# Patient Record
Sex: FEMALE | Race: OTHER | HISPANIC OR LATINO
[De-identification: names, ages, dates, MRNs, and addresses within clinical notes are randomized per-mention and may not be internally consistent; named-entity substitution may affect disease eponyms.]

---

## 2017-03-27 ENCOUNTER — APPOINTMENT (OUTPATIENT)
Dept: ORTHOPEDIC SURGERY | Facility: CLINIC | Age: 67
End: 2017-03-27

## 2017-03-27 VITALS
HEIGHT: 63 IN | WEIGHT: 126 LBS | DIASTOLIC BLOOD PRESSURE: 75 MMHG | SYSTOLIC BLOOD PRESSURE: 119 MMHG | BODY MASS INDEX: 22.32 KG/M2 | HEART RATE: 70 BPM | TEMPERATURE: 98.3 F

## 2017-07-11 ENCOUNTER — MESSAGE (OUTPATIENT)
Age: 67
End: 2017-07-11

## 2017-09-11 ENCOUNTER — APPOINTMENT (OUTPATIENT)
Dept: ORTHOPEDIC SURGERY | Facility: CLINIC | Age: 67
End: 2017-09-11
Payer: MEDICARE

## 2017-09-11 VITALS
TEMPERATURE: 98.3 F | SYSTOLIC BLOOD PRESSURE: 125 MMHG | BODY MASS INDEX: 22.32 KG/M2 | HEART RATE: 65 BPM | WEIGHT: 126 LBS | DIASTOLIC BLOOD PRESSURE: 71 MMHG | HEIGHT: 63 IN

## 2017-09-11 PROCEDURE — 99214 OFFICE O/P EST MOD 30 MIN: CPT | Mod: 25

## 2017-09-11 PROCEDURE — 20610 DRAIN/INJ JOINT/BURSA W/O US: CPT | Mod: RT

## 2017-12-08 ENCOUNTER — APPOINTMENT (OUTPATIENT)
Dept: NEPHROLOGY | Facility: CLINIC | Age: 67
End: 2017-12-08
Payer: MEDICARE

## 2017-12-08 VITALS
HEIGHT: 63 IN | SYSTOLIC BLOOD PRESSURE: 122 MMHG | BODY MASS INDEX: 22.32 KG/M2 | DIASTOLIC BLOOD PRESSURE: 80 MMHG | WEIGHT: 126 LBS

## 2017-12-08 DIAGNOSIS — M25.551 PAIN IN RIGHT HIP: ICD-10-CM

## 2017-12-08 DIAGNOSIS — N39.0 URINARY TRACT INFECTION, SITE NOT SPECIFIED: ICD-10-CM

## 2017-12-08 PROCEDURE — 36415 COLL VENOUS BLD VENIPUNCTURE: CPT

## 2017-12-08 PROCEDURE — 99205 OFFICE O/P NEW HI 60 MIN: CPT | Mod: 25

## 2017-12-08 RX ORDER — METHYLPREDNISOLONE 4 MG/1
4 TABLET ORAL
Qty: 21 | Refills: 0 | Status: DISCONTINUED | COMMUNITY
Start: 2017-03-27 | End: 2017-12-08

## 2017-12-09 LAB
25(OH)D3 SERPL-MCNC: 29.6 NG/ML
ALBUMIN SERPL ELPH-MCNC: 4.3 G/DL
ANION GAP SERPL CALC-SCNC: 17 MMOL/L
APPEARANCE: CLEAR
BACTERIA: ABNORMAL
BASOPHILS # BLD AUTO: 0.03 K/UL
BASOPHILS NFR BLD AUTO: 0.4 %
BILIRUBIN URINE: ABNORMAL
BLOOD URINE: ABNORMAL
BUN SERPL-MCNC: 24 MG/DL
CALCIUM SERPL-MCNC: 10 MG/DL
CALCIUM SERPL-MCNC: 10 MG/DL
CHLORIDE SERPL-SCNC: 102 MMOL/L
CO2 SERPL-SCNC: 26 MMOL/L
COLOR: ABNORMAL
CREAT SERPL-MCNC: 1.46 MG/DL
CREAT SPEC-SCNC: 470 MG/DL
CREAT/PROT UR: 0.1 RATIO
EOSINOPHIL # BLD AUTO: 0.08 K/UL
EOSINOPHIL NFR BLD AUTO: 1
GLUCOSE QUALITATIVE U: NEGATIVE MG/DL
GLUCOSE SERPL-MCNC: 82 MG/DL
GRANULAR CASTS: 2 /LPF
HCT VFR BLD CALC: 44.9 %
HGB BLD-MCNC: 14.1 G/DL
HYALINE CASTS: 5 /LPF
IMM GRANULOCYTES NFR BLD AUTO: 0.5 %
KETONES URINE: NEGATIVE
LEUKOCYTE ESTERASE URINE: NEGATIVE
LYMPHOCYTES # BLD AUTO: 1.72 K/UL
LYMPHOCYTES NFR BLD AUTO: 21.3 %
MAN DIFF?: NORMAL
MCHC RBC-ENTMCNC: 30.5 PG
MCHC RBC-ENTMCNC: 31.4 GM/DL
MCV RBC AUTO: 97.2 FL
MICROSCOPIC-UA: NORMAL
MONOCYTES # BLD AUTO: 0.73 K/UL
MONOCYTES NFR BLD AUTO: 9 %
NEUTROPHILS # BLD AUTO: 5.49 K/UL
NEUTROPHILS NFR BLD AUTO: 67.8 %
NITRITE URINE: NEGATIVE
PARATHYROID HORMONE INTACT: 52 PG/ML
PH URINE: 5
PHOSPHATE SERPL-MCNC: 4.3 MG/DL
PLATELET # BLD AUTO: 387 K/UL
POTASSIUM SERPL-SCNC: 4.5 MMOL/L
PROT UR-MCNC: 48 MG/DL
PROTEIN URINE: 30 MG/DL
RBC # BLD: 4.62 M/UL
RBC # FLD: 16.8 %
RED BLOOD CELLS URINE: 20 /HPF
SODIUM SERPL-SCNC: 145 MMOL/L
SPECIFIC GRAVITY URINE: 1.03
SQUAMOUS EPITHELIAL CELLS: 4 /HPF
URINE COMMENTS: NORMAL
UROBILINOGEN URINE: NEGATIVE MG/DL
WBC # FLD AUTO: 8.09 K/UL
WHITE BLOOD CELLS URINE: 6 /HPF

## 2017-12-11 LAB — BACTERIA UR CULT: NORMAL

## 2018-01-05 ENCOUNTER — APPOINTMENT (OUTPATIENT)
Dept: NEPHROLOGY | Facility: CLINIC | Age: 68
End: 2018-01-05

## 2018-01-12 ENCOUNTER — APPOINTMENT (OUTPATIENT)
Dept: NEPHROLOGY | Facility: CLINIC | Age: 68
End: 2018-01-12
Payer: MEDICARE

## 2018-01-12 VITALS
DIASTOLIC BLOOD PRESSURE: 80 MMHG | HEIGHT: 63 IN | BODY MASS INDEX: 22.32 KG/M2 | SYSTOLIC BLOOD PRESSURE: 120 MMHG | WEIGHT: 126 LBS

## 2018-01-12 DIAGNOSIS — I10 ESSENTIAL (PRIMARY) HYPERTENSION: ICD-10-CM

## 2018-01-12 PROCEDURE — 36415 COLL VENOUS BLD VENIPUNCTURE: CPT

## 2018-01-12 PROCEDURE — 99215 OFFICE O/P EST HI 40 MIN: CPT | Mod: 25

## 2018-01-12 RX ORDER — AMLODIPINE BESYLATE 10 MG/1
10 TABLET ORAL
Qty: 90 | Refills: 0 | Status: ACTIVE | COMMUNITY
Start: 2017-11-13

## 2018-01-13 LAB
ALBUMIN SERPL ELPH-MCNC: 3.9 G/DL
ANION GAP SERPL CALC-SCNC: 17 MMOL/L
APPEARANCE: ABNORMAL
BACTERIA: NEGATIVE
BASOPHILS # BLD AUTO: 0.05 K/UL
BASOPHILS NFR BLD AUTO: 0.5 %
BILIRUBIN URINE: NEGATIVE
BLOOD URINE: ABNORMAL
BUN SERPL-MCNC: 25 MG/DL
CALCIUM SERPL-MCNC: 9.5 MG/DL
CHLORIDE SERPL-SCNC: 100 MMOL/L
CO2 SERPL-SCNC: 28 MMOL/L
COLOR: ABNORMAL
CREAT SERPL-MCNC: 1.59 MG/DL
CREAT SPEC-SCNC: 283 MG/DL
CREAT/PROT UR: 0.4 RATIO
EOSINOPHIL # BLD AUTO: 0.09 K/UL
EOSINOPHIL NFR BLD AUTO: 1 %
GLUCOSE QUALITATIVE U: NEGATIVE MG/DL
GLUCOSE SERPL-MCNC: 78 MG/DL
HCT VFR BLD CALC: 42.3 %
HGB BLD-MCNC: 13.2 G/DL
HYALINE CASTS: 6 /LPF
IMM GRANULOCYTES NFR BLD AUTO: 0.4 %
KETONES URINE: ABNORMAL
LEUKOCYTE ESTERASE URINE: NEGATIVE
LYMPHOCYTES # BLD AUTO: 1.75 K/UL
LYMPHOCYTES NFR BLD AUTO: 18.9 %
MAN DIFF?: NORMAL
MCHC RBC-ENTMCNC: 30.1 PG
MCHC RBC-ENTMCNC: 31.2 GM/DL
MCV RBC AUTO: 96.6 FL
MICROSCOPIC-UA: NORMAL
MONOCYTES # BLD AUTO: 0.91 K/UL
MONOCYTES NFR BLD AUTO: 9.8 %
NEUTROPHILS # BLD AUTO: 6.4 K/UL
NEUTROPHILS NFR BLD AUTO: 69.4 %
NITRITE URINE: NEGATIVE
PH URINE: 6
PHOSPHATE SERPL-MCNC: 4.2 MG/DL
PLATELET # BLD AUTO: 400 K/UL
POTASSIUM SERPL-SCNC: 4.9 MMOL/L
PROT UR-MCNC: 112 MG/DL
PROTEIN URINE: 100 MG/DL
RBC # BLD: 4.38 M/UL
RBC # FLD: 16.4 %
RED BLOOD CELLS URINE: 8 /HPF
SODIUM SERPL-SCNC: 145 MMOL/L
SPECIFIC GRAVITY URINE: 1.03
SQUAMOUS EPITHELIAL CELLS: 2 /HPF
URINE COMMENTS: NORMAL
UROBILINOGEN URINE: 1 MG/DL
WBC # FLD AUTO: 9.24 K/UL
WHITE BLOOD CELLS URINE: 4 /HPF

## 2018-01-13 RX ORDER — FLUCONAZOLE 150 MG/1
150 TABLET ORAL
Qty: 2 | Refills: 0 | Status: DISCONTINUED | COMMUNITY
Start: 2017-12-28 | End: 2018-01-13

## 2018-01-13 RX ORDER — OMEPRAZOLE 20 MG/1
20 CAPSULE, DELAYED RELEASE ORAL
Qty: 90 | Refills: 0 | Status: ACTIVE | COMMUNITY
Start: 2017-11-13

## 2018-01-13 RX ORDER — METOPROLOL SUCCINATE 50 MG/1
50 TABLET, EXTENDED RELEASE ORAL
Qty: 180 | Refills: 0 | Status: ACTIVE | COMMUNITY
Start: 2017-11-13

## 2018-01-13 RX ORDER — CEPHALEXIN 500 MG/1
500 CAPSULE ORAL
Qty: 14 | Refills: 0 | Status: DISCONTINUED | COMMUNITY
Start: 2017-12-28 | End: 2018-01-13

## 2018-01-13 RX ORDER — ZOLPIDEM TARTRATE 10 MG/1
10 TABLET ORAL
Qty: 30 | Refills: 0 | Status: ACTIVE | COMMUNITY
Start: 2017-11-07

## 2018-01-13 RX ORDER — UREA 39 G/100G
39 CREAM TOPICAL
Qty: 227 | Refills: 0 | Status: DISCONTINUED | COMMUNITY
Start: 2017-07-21 | End: 2018-01-13

## 2018-01-13 RX ORDER — MOMETASONE FUROATE 1 MG/G
0.1 CREAM TOPICAL
Qty: 45 | Refills: 0 | Status: DISCONTINUED | COMMUNITY
Start: 2017-09-12 | End: 2018-01-13

## 2018-01-13 RX ORDER — DICLOFENAC SODIUM 10 MG/G
1 GEL TOPICAL
Qty: 100 | Refills: 0 | Status: DISCONTINUED | COMMUNITY
Start: 2017-10-16 | End: 2018-01-13

## 2018-01-13 RX ORDER — DULOXETINE HYDROCHLORIDE 30 MG/1
30 CAPSULE, DELAYED RELEASE PELLETS ORAL
Qty: 60 | Refills: 0 | Status: ACTIVE | COMMUNITY
Start: 2017-11-07

## 2018-01-13 RX ORDER — IPRATROPIUM BROMIDE 42 UG/1
0.06 SPRAY NASAL
Qty: 15 | Refills: 0 | Status: DISCONTINUED | COMMUNITY
Start: 2017-09-12 | End: 2018-01-13

## 2018-01-17 ENCOUNTER — OTHER (OUTPATIENT)
Age: 68
End: 2018-01-17

## 2018-01-17 DIAGNOSIS — R31.29 OTHER MICROSCOPIC HEMATURIA: ICD-10-CM

## 2018-03-20 ENCOUNTER — APPOINTMENT (OUTPATIENT)
Dept: ORTHOPEDIC SURGERY | Facility: CLINIC | Age: 68
End: 2018-03-20
Payer: OTHER MISCELLANEOUS

## 2018-03-20 VITALS
HEIGHT: 63 IN | BODY MASS INDEX: 22.32 KG/M2 | DIASTOLIC BLOOD PRESSURE: 77 MMHG | HEART RATE: 70 BPM | SYSTOLIC BLOOD PRESSURE: 121 MMHG | WEIGHT: 126 LBS

## 2018-03-20 DIAGNOSIS — M47.812 SPONDYLOSIS W/OUT MYELOPATHY OR RADICULOPATHY, CERVICAL REGION: ICD-10-CM

## 2018-03-20 DIAGNOSIS — Z86.39 PERSONAL HISTORY OF OTHER ENDOCRINE, NUTRITIONAL AND METABOLIC DISEASE: ICD-10-CM

## 2018-03-20 DIAGNOSIS — N18.3 CHRONIC KIDNEY DISEASE, STAGE 3 (MODERATE): ICD-10-CM

## 2018-03-20 DIAGNOSIS — M75.51 BURSITIS OF RIGHT SHOULDER: ICD-10-CM

## 2018-03-20 PROCEDURE — 99204 OFFICE O/P NEW MOD 45 MIN: CPT

## 2018-03-20 PROCEDURE — 72040 X-RAY EXAM NECK SPINE 2-3 VW: CPT

## 2018-04-16 ENCOUNTER — APPOINTMENT (OUTPATIENT)
Dept: ORTHOPEDIC SURGERY | Facility: CLINIC | Age: 68
End: 2018-04-16
Payer: MEDICARE

## 2018-04-16 VITALS
SYSTOLIC BLOOD PRESSURE: 133 MMHG | BODY MASS INDEX: 22.15 KG/M2 | HEART RATE: 67 BPM | DIASTOLIC BLOOD PRESSURE: 80 MMHG | WEIGHT: 125 LBS | TEMPERATURE: 98.1 F | HEIGHT: 63 IN

## 2018-04-16 PROCEDURE — 73502 X-RAY EXAM HIP UNI 2-3 VIEWS: CPT | Mod: RT

## 2018-04-16 PROCEDURE — 99213 OFFICE O/P EST LOW 20 MIN: CPT

## 2018-04-16 RX ORDER — FLUCONAZOLE 200 MG/1
200 TABLET ORAL
Qty: 3 | Refills: 0 | Status: ACTIVE | COMMUNITY
Start: 2018-04-03

## 2018-04-16 RX ORDER — LUBIPROSTONE 8 UG/1
8 CAPSULE, GELATIN COATED ORAL
Qty: 180 | Refills: 0 | Status: ACTIVE | COMMUNITY
Start: 2018-02-06

## 2018-04-16 RX ORDER — MUPIROCIN 20 MG/G
2 OINTMENT TOPICAL
Qty: 22 | Refills: 0 | Status: ACTIVE | COMMUNITY
Start: 2018-03-06

## 2018-05-07 ENCOUNTER — APPOINTMENT (OUTPATIENT)
Dept: ORTHOPEDIC SURGERY | Facility: CLINIC | Age: 68
End: 2018-05-07

## 2018-05-07 DIAGNOSIS — M25.511 PAIN IN RIGHT SHOULDER: ICD-10-CM

## 2018-07-16 ENCOUNTER — APPOINTMENT (OUTPATIENT)
Dept: NEPHROLOGY | Facility: CLINIC | Age: 68
End: 2018-07-16

## 2018-09-10 ENCOUNTER — OTHER (OUTPATIENT)
Age: 68
End: 2018-09-10

## 2018-10-15 ENCOUNTER — APPOINTMENT (OUTPATIENT)
Dept: ORTHOPEDIC SURGERY | Facility: CLINIC | Age: 68
End: 2018-10-15
Payer: MEDICARE

## 2018-10-15 VITALS
HEART RATE: 58 BPM | WEIGHT: 125 LBS | SYSTOLIC BLOOD PRESSURE: 143 MMHG | DIASTOLIC BLOOD PRESSURE: 77 MMHG | BODY MASS INDEX: 22.15 KG/M2 | HEIGHT: 63 IN | TEMPERATURE: 99.3 F

## 2018-10-15 DIAGNOSIS — M25.562 PAIN IN RIGHT KNEE: ICD-10-CM

## 2018-10-15 DIAGNOSIS — M25.561 PAIN IN RIGHT KNEE: ICD-10-CM

## 2018-10-15 DIAGNOSIS — M25.50 PAIN IN UNSPECIFIED JOINT: ICD-10-CM

## 2018-10-15 PROCEDURE — 20610 DRAIN/INJ JOINT/BURSA W/O US: CPT | Mod: 59,RT

## 2018-10-15 PROCEDURE — 73562 X-RAY EXAM OF KNEE 3: CPT | Mod: 50

## 2018-10-15 PROCEDURE — 99214 OFFICE O/P EST MOD 30 MIN: CPT | Mod: 25

## 2019-01-10 ENCOUNTER — EMERGENCY (EMERGENCY)
Facility: HOSPITAL | Age: 69
LOS: 1 days | Discharge: HOME | End: 2019-01-10
Attending: EMERGENCY MEDICINE
Payer: MEDICARE

## 2019-01-10 VITALS
OXYGEN SATURATION: 100 % | HEART RATE: 66 BPM | DIASTOLIC BLOOD PRESSURE: 76 MMHG | WEIGHT: 126.1 LBS | RESPIRATION RATE: 18 BRPM | TEMPERATURE: 98 F | HEIGHT: 63 IN | SYSTOLIC BLOOD PRESSURE: 150 MMHG

## 2019-01-10 DIAGNOSIS — Z98.89 OTHER SPECIFIED POSTPROCEDURAL STATES: Chronic | ICD-10-CM

## 2019-01-10 DIAGNOSIS — Z87.19 PERSONAL HISTORY OF OTHER DISEASES OF THE DIGESTIVE SYSTEM: Chronic | ICD-10-CM

## 2019-01-10 DIAGNOSIS — N83.20 UNSPECIFIED OVARIAN CYSTS: Chronic | ICD-10-CM

## 2019-01-10 LAB
HCT VFR BLD CALC: 38.8 % — SIGNIFICANT CHANGE UP (ref 37–47)
HGB BLD-MCNC: 12.9 G/DL — SIGNIFICANT CHANGE UP (ref 12–16)
MCHC RBC-ENTMCNC: 31 PG — SIGNIFICANT CHANGE UP (ref 27–31)
MCHC RBC-ENTMCNC: 33.2 G/DL — SIGNIFICANT CHANGE UP (ref 32–36)
MCV RBC AUTO: 93.3 FL — SIGNIFICANT CHANGE UP (ref 81–99)
PLATELET # BLD AUTO: 342 K/UL — SIGNIFICANT CHANGE UP (ref 150–400)
RBC # BLD: 4.16 M/UL — LOW (ref 4.4–5.2)
RBC # FLD: 15 % — SIGNIFICANT CHANGE UP (ref 11–15.6)
WBC # BLD: 6.4 K/UL — SIGNIFICANT CHANGE UP (ref 4.8–10.8)
WBC # FLD AUTO: 6.4 K/UL — SIGNIFICANT CHANGE UP (ref 4.8–10.8)

## 2019-01-10 PROCEDURE — 71046 X-RAY EXAM CHEST 2 VIEWS: CPT | Mod: 26

## 2019-01-10 PROCEDURE — 99284 EMERGENCY DEPT VISIT MOD MDM: CPT | Mod: 25

## 2019-01-10 PROCEDURE — 93010 ELECTROCARDIOGRAM REPORT: CPT

## 2019-01-10 RX ORDER — CYCLOBENZAPRINE HYDROCHLORIDE 10 MG/1
10 TABLET, FILM COATED ORAL ONCE
Qty: 0 | Refills: 0 | Status: COMPLETED | OUTPATIENT
Start: 2019-01-10 | End: 2019-01-10

## 2019-01-10 RX ORDER — KETOROLAC TROMETHAMINE 30 MG/ML
15 SYRINGE (ML) INJECTION ONCE
Qty: 0 | Refills: 0 | Status: DISCONTINUED | OUTPATIENT
Start: 2019-01-10 | End: 2019-01-10

## 2019-01-10 RX ADMIN — Medication 15 MILLIGRAM(S): at 23:30

## 2019-01-10 RX ADMIN — CYCLOBENZAPRINE HYDROCHLORIDE 10 MILLIGRAM(S): 10 TABLET, FILM COATED ORAL at 23:24

## 2019-01-10 NOTE — ED ADULT NURSE NOTE - NSIMPLEMENTINTERV_GEN_ALL_ED
Implemented All Universal Safety Interventions:  [place] to call system. Call bell, personal items and telephone within reach. Instruct patient to call for assistance. Room bathroom lighting operational. Non-slip footwear when patient is off stretcher. Physically safe environment: no spills, clutter or unnecessary equipment. Stretcher in lowest position, wheels locked, appropriate side rails in place.

## 2019-01-10 NOTE — ED ADULT TRIAGE NOTE - CHIEF COMPLAINT QUOTE
Patient A&Ox4 complaining of chest pain radiating down arm x2 days, describes as pressure, stated cannot lift arm. Moving extremity without difficulty during triage. Respirations even & unlabored.

## 2019-01-10 NOTE — ED PROVIDER NOTE - NS ED ROS FT
no weight change, no fever or chills  no recent travel, no recent abox, no sick contacts  no recent change in medications  no rash, no bruises  no visual changes no eye discharge  no cough cold +congestion,   +sob, +chest pain  follows with cardiology  +stress test, no cath  no orthopnea, no pnd  no abd pain, no n/v/d  no endoscopy, +colonoscopy  no hematuria, no change in urinary habits  no joint pain, no deformity  no headache, +numbness to hands

## 2019-01-10 NOTE — ED PROVIDER NOTE - OBJECTIVE STATEMENT
67 y/o F with hx of DM presents to the ED c/o chest tightness with radiation of pain into L arm x3-4 days, worsening yesterday. Pt states she developed difficulty breathing today prompting her to seek ED evaluation. No PMD evaluation for sx. Pt reports difficulty with ROM of L arm secondary to pain. Denies recent travel, fever, chills, VIDES, cough, cold, orthopnea, recent abx. Pt has had recent stress test within the past few months, results negative. Last cardiologist evaluation 1 month ago. Pt was also evaluated by PMD at the time for medical clearance to receive epidural shots at neck. Pt has had colonoscopy ~2 years ago, results negative. Denies hx of MI. Pt states she was found with stroke 2-3 years ago.  PMD is Dr. Castillo  Cardiologist is Dr. Joe Zhou.

## 2019-01-10 NOTE — ED PROVIDER NOTE - PMH
Depression    Diabetes    GERD (gastroesophageal reflux disease)    Hypertension    Hypothyroid    Tachycardia  Not sure of specific name of heart irregularity

## 2019-01-10 NOTE — ED ADULT NURSE NOTE - CAS EDN DISCHARGE ASSESSMENT
Awake/Patient baseline mental status/Symptoms improved/Dressing clean and dry/Alert and oriented to person, place and time

## 2019-01-10 NOTE — ED PROVIDER NOTE - PSH
Bilateral ovarian cysts  left oopherectomy  H/O small bowel obstruction  with 3 surgeries  History of appendectomy

## 2019-01-10 NOTE — ED PROVIDER NOTE - MEDICAL DECISION MAKING DETAILS
67 y/o F followed by cardiologist presents with L sided chest pain and L shoulder pain worse with movement, plan to do 2 sets, obs and re-evaluate.

## 2019-01-10 NOTE — ED ADULT NURSE NOTE - OBJECTIVE STATEMENT
Patient complains of pressure in left side of chest radiating to left arm. Pain started few days ago, patient called the ED service and they told her to chew a 325mg Aspirin. Pain started while patient was driving, patient still feels pain at rest. SOB began today, patient came to ED. Denies vomiting, complains of nausea earlier in the morning, but resolved.

## 2019-01-10 NOTE — ED PROVIDER NOTE - PHYSICAL EXAMINATION
Constitutional : Appears comfortably, talking in full sentences  Head :NC AT , no swelling  Eyes :eomi, no swelling  Mouth :mm moist,  Neck : supple, trachea in midline  Chest :Madi air entry, symm chest expansion, no distress  Heart :S1 S2 distant  Abdomen :abd soft, non tender  Musc/Skel :ext no swelling, no deformity, no spine tenderness, distal pulses present  Neuro  :AAO 3 no focal deficits Constitutional : Appears uncomfortably secondary to change of position, talking in full sentences  Head :NC AT , no swelling  Eyes :eomi, no swelling  Mouth :mm moist,  Neck : supple, trachea in midline  Chest :Madi air entry, symm chest expansion, no distress  Heart :S1 S2 distant  Abdomen :abd soft, non tender  Musc/Skel :ext no swelling, no deformity, no spine tenderness, distal pulses present, pt c/o pain exacerbation with movement of LUE, TTP at upper back  Neuro  :AAO 3 no focal deficits

## 2019-01-11 VITALS
OXYGEN SATURATION: 97 % | HEART RATE: 48 BPM | DIASTOLIC BLOOD PRESSURE: 80 MMHG | TEMPERATURE: 98 F | RESPIRATION RATE: 16 BRPM | SYSTOLIC BLOOD PRESSURE: 144 MMHG

## 2019-01-11 LAB
ALBUMIN SERPL ELPH-MCNC: 3.7 G/DL — SIGNIFICANT CHANGE UP (ref 3.3–5.2)
ALP SERPL-CCNC: 72 U/L — SIGNIFICANT CHANGE UP (ref 40–120)
ALT FLD-CCNC: 13 U/L — SIGNIFICANT CHANGE UP
ANION GAP SERPL CALC-SCNC: 13 MMOL/L — SIGNIFICANT CHANGE UP (ref 5–17)
APPEARANCE UR: CLEAR — SIGNIFICANT CHANGE UP
APTT BLD: 27.9 SEC — SIGNIFICANT CHANGE UP (ref 27.5–36.3)
AST SERPL-CCNC: 22 U/L — SIGNIFICANT CHANGE UP
BACTERIA # UR AUTO: ABNORMAL
BILIRUB SERPL-MCNC: 0.3 MG/DL — LOW (ref 0.4–2)
BILIRUB UR-MCNC: NEGATIVE — SIGNIFICANT CHANGE UP
BUN SERPL-MCNC: 17 MG/DL — SIGNIFICANT CHANGE UP (ref 8–20)
CALCIUM SERPL-MCNC: 8.9 MG/DL — SIGNIFICANT CHANGE UP (ref 8.6–10.2)
CHLORIDE SERPL-SCNC: 102 MMOL/L — SIGNIFICANT CHANGE UP (ref 98–107)
CO2 SERPL-SCNC: 26 MMOL/L — SIGNIFICANT CHANGE UP (ref 22–29)
COLOR SPEC: YELLOW — SIGNIFICANT CHANGE UP
CREAT SERPL-MCNC: 0.82 MG/DL — SIGNIFICANT CHANGE UP (ref 0.5–1.3)
D DIMER BLD IA.RAPID-MCNC: 264 NG/ML DDU — HIGH
DIFF PNL FLD: ABNORMAL
EPI CELLS # UR: SIGNIFICANT CHANGE UP
GLUCOSE SERPL-MCNC: 124 MG/DL — HIGH (ref 70–115)
GLUCOSE UR QL: NEGATIVE MG/DL — SIGNIFICANT CHANGE UP
INR BLD: 0.95 RATIO — SIGNIFICANT CHANGE UP (ref 0.88–1.16)
KETONES UR-MCNC: NEGATIVE — SIGNIFICANT CHANGE UP
LEUKOCYTE ESTERASE UR-ACNC: ABNORMAL
LIDOCAIN IGE QN: 62 U/L — HIGH (ref 22–51)
MAGNESIUM SERPL-MCNC: 1.8 MG/DL — SIGNIFICANT CHANGE UP (ref 1.6–2.6)
NITRITE UR-MCNC: NEGATIVE — SIGNIFICANT CHANGE UP
PH UR: 7 — SIGNIFICANT CHANGE UP (ref 5–8)
POTASSIUM SERPL-MCNC: 3.8 MMOL/L — SIGNIFICANT CHANGE UP (ref 3.5–5.3)
POTASSIUM SERPL-SCNC: 3.8 MMOL/L — SIGNIFICANT CHANGE UP (ref 3.5–5.3)
PROT SERPL-MCNC: 6.7 G/DL — SIGNIFICANT CHANGE UP (ref 6.6–8.7)
PROT UR-MCNC: NEGATIVE MG/DL — SIGNIFICANT CHANGE UP
PROTHROM AB SERPL-ACNC: 10.9 SEC — SIGNIFICANT CHANGE UP (ref 10–12.9)
RBC CASTS # UR COMP ASSIST: ABNORMAL /HPF (ref 0–4)
SODIUM SERPL-SCNC: 141 MMOL/L — SIGNIFICANT CHANGE UP (ref 135–145)
SP GR SPEC: 1 — LOW (ref 1.01–1.02)
TROPONIN T SERPL-MCNC: <0.01 NG/ML — SIGNIFICANT CHANGE UP (ref 0–0.06)
UROBILINOGEN FLD QL: NEGATIVE MG/DL — SIGNIFICANT CHANGE UP
WBC UR QL: SIGNIFICANT CHANGE UP

## 2019-01-11 PROCEDURE — 71046 X-RAY EXAM CHEST 2 VIEWS: CPT

## 2019-01-11 PROCEDURE — 96374 THER/PROPH/DIAG INJ IV PUSH: CPT

## 2019-01-11 PROCEDURE — 99218: CPT

## 2019-01-11 PROCEDURE — 83690 ASSAY OF LIPASE: CPT

## 2019-01-11 PROCEDURE — 85379 FIBRIN DEGRADATION QUANT: CPT

## 2019-01-11 PROCEDURE — 85027 COMPLETE CBC AUTOMATED: CPT

## 2019-01-11 PROCEDURE — 36415 COLL VENOUS BLD VENIPUNCTURE: CPT

## 2019-01-11 PROCEDURE — 85610 PROTHROMBIN TIME: CPT

## 2019-01-11 PROCEDURE — 85730 THROMBOPLASTIN TIME PARTIAL: CPT

## 2019-01-11 PROCEDURE — 71275 CT ANGIOGRAPHY CHEST: CPT | Mod: 26

## 2019-01-11 PROCEDURE — 83735 ASSAY OF MAGNESIUM: CPT

## 2019-01-11 PROCEDURE — 93005 ELECTROCARDIOGRAM TRACING: CPT

## 2019-01-11 PROCEDURE — 80053 COMPREHEN METABOLIC PANEL: CPT

## 2019-01-11 PROCEDURE — 81001 URINALYSIS AUTO W/SCOPE: CPT

## 2019-01-11 PROCEDURE — 93010 ELECTROCARDIOGRAM REPORT: CPT | Mod: 76

## 2019-01-11 PROCEDURE — 71275 CT ANGIOGRAPHY CHEST: CPT

## 2019-01-11 PROCEDURE — 99284 EMERGENCY DEPT VISIT MOD MDM: CPT | Mod: 25

## 2019-01-11 PROCEDURE — G0378: CPT

## 2019-01-11 PROCEDURE — 84484 ASSAY OF TROPONIN QUANT: CPT

## 2019-01-11 RX ORDER — ASPIRIN/CALCIUM CARB/MAGNESIUM 324 MG
325 TABLET ORAL ONCE
Qty: 0 | Refills: 0 | Status: DISCONTINUED | OUTPATIENT
Start: 2019-01-11 | End: 2019-01-11

## 2019-01-11 RX ORDER — PANTOPRAZOLE SODIUM 20 MG/1
40 TABLET, DELAYED RELEASE ORAL
Qty: 0 | Refills: 0 | Status: DISCONTINUED | OUTPATIENT
Start: 2019-01-11 | End: 2019-01-15

## 2019-01-11 RX ORDER — LEVOTHYROXINE SODIUM 125 MCG
88 TABLET ORAL DAILY
Qty: 0 | Refills: 0 | Status: DISCONTINUED | OUTPATIENT
Start: 2019-01-11 | End: 2019-01-15

## 2019-01-11 RX ORDER — METOPROLOL TARTRATE 50 MG
50 TABLET ORAL DAILY
Qty: 0 | Refills: 0 | Status: DISCONTINUED | OUTPATIENT
Start: 2019-01-11 | End: 2019-01-15

## 2019-01-11 RX ORDER — AMLODIPINE BESYLATE 2.5 MG/1
10 TABLET ORAL ONCE
Qty: 0 | Refills: 0 | Status: COMPLETED | OUTPATIENT
Start: 2019-01-11 | End: 2019-01-11

## 2019-01-11 RX ORDER — ACETAMINOPHEN 500 MG
650 TABLET ORAL EVERY 6 HOURS
Qty: 0 | Refills: 0 | Status: DISCONTINUED | OUTPATIENT
Start: 2019-01-11 | End: 2019-01-15

## 2019-01-11 RX ORDER — ZOLPIDEM TARTRATE 10 MG/1
5 TABLET ORAL AT BEDTIME
Qty: 0 | Refills: 0 | Status: DISCONTINUED | OUTPATIENT
Start: 2019-01-11 | End: 2019-01-11

## 2019-01-11 RX ORDER — GLYBURIDE 5 MG
2.5 TABLET ORAL
Qty: 0 | Refills: 0 | Status: DISCONTINUED | OUTPATIENT
Start: 2019-01-11 | End: 2019-01-15

## 2019-01-11 RX ADMIN — Medication 650 MILLIGRAM(S): at 04:50

## 2019-01-11 RX ADMIN — PANTOPRAZOLE SODIUM 40 MILLIGRAM(S): 20 TABLET, DELAYED RELEASE ORAL at 09:32

## 2019-01-11 RX ADMIN — Medication 650 MILLIGRAM(S): at 06:09

## 2019-01-11 RX ADMIN — Medication 88 MICROGRAM(S): at 06:08

## 2019-01-11 RX ADMIN — Medication 15 MILLIGRAM(S): at 09:33

## 2019-01-11 RX ADMIN — Medication 50 MILLIGRAM(S): at 06:28

## 2019-01-11 RX ADMIN — AMLODIPINE BESYLATE 10 MILLIGRAM(S): 2.5 TABLET ORAL at 06:07

## 2019-01-11 NOTE — ED ADULT NURSE REASSESSMENT NOTE - NS ED NURSE REASSESS COMMENT FT1
Pt alert and oriented. resting in stretcher, no signs of distress noted. Handoff  Fahad Thompson RN given to and safety maintained. RN with no questions or concerns at this time
assumed pt care from previous Rn Tatum Denton.  pt returned from CT scan. pt transported to CDU-7 for observation. pt  A&Ox3;  resting in stretcher, with complaints of 1/10 discomfort to left arm. denies any radiation of pain. KULDIP Tineo made aware and per PA will place orders for Tylenol.  B/L lungs clear, normal s1&s2 heard on ausculation. (+) pedal pulses, skin warm/dry intact. IV patent. VSS and documented as per flow sheet.  PA at bedside; plan of care reviewed and pt verbalizes understanding. bed in lowest position, call bell within reach and safety maintained. monitoring ongoing for any changes.
Report received from offgoing RN, chart as noted, pt a&ox3 resting comfortably on stretcher, NSR on cm. RR even and unlabored. Skin warm and dry. Appears in no apparent distress @ this time, safety maintained, call bell in reach. Urine cup provided, pt verbalized understanding of providing urine sample @ this time

## 2019-01-11 NOTE — ED CDU PROVIDER DISPOSITION NOTE - CLINICAL COURSE
Patient evaluated on morning obs rounds; patient currently symptom free; trops neg x 3; ct angio negative; no events on tele; consult appreciated and reviewed from [facility]; patient with recent ischemic eval, echo, and nuc stress test, all without ischemic concerns; agree with [facility] recommendations, ok for d/c with outpt f/u

## 2019-01-11 NOTE — ED CDU PROVIDER DISPOSITION NOTE - CARE PROVIDER_API CALL
Sylvie Lerner), Cardiovascular Disease; Internal Medicine  [address] [place] [address]  Phone: [phone]  Fax: [phone]

## 2019-01-11 NOTE — ED CDU PROVIDER INITIAL DAY NOTE - ATTENDING CONTRIBUTION TO CARE
I, NIMESH Gomez MD, personally performed the services described in the documentation, reviewed the documentation recorded by the scribe in my presence and it accurately and completely records my words and action

## 2019-01-11 NOTE — ED CDU PROVIDER INITIAL DAY NOTE - PHYSICAL EXAMINATION
Constitutional : Appears uncomfortably secondary to change of position, talking in full sentences  Head :NC AT , no swelling  Eyes :eomi, no swelling  Mouth :mm moist,  Neck : supple, trachea in midline  Chest :Madi air entry, symm chest expansion, no distress  Heart :S1 S2 distant  Abdomen :abd soft, non tender  Musc/Skel :ext no swelling, no deformity, no spine tenderness, distal pulses present, pt c/o pain exacerbation with movement of LUE, TTP at upper back  Neuro  :AAO 3 no focal deficits

## 2019-01-11 NOTE — ED CDU PROVIDER INITIAL DAY NOTE - OBJECTIVE STATEMENT
69 y/o F with hx of DM presents to the ED c/o chest tightness with radiation of pain into L arm x3-4 days, worsening yesterday. Pt states she developed difficulty breathing today prompting her to seek ED evaluation. No PMD evaluation for sx. Pt reports difficulty with ROM of L arm secondary to pain. Denies recent travel, fever, chills, VIDES, cough, cold, orthopnea, recent abx. Pt has had recent stress test within the past few months, results negative. Last cardiologist evaluation 1 month ago. Pt was also evaluated by PMD at the time for medical clearance to receive epidural shots at neck. Pt has had colonoscopy ~2 years ago, results negative. Denies hx of MI. Pt states she was found with stroke 2-3 years ago.  PMD is Dr. Castillo  Cardiologist is Dr. Joe Zhou.

## 2019-01-11 NOTE — CONSULT NOTE ADULT - SUBJECTIVE AND OBJECTIVE BOX
[facility]                                                    [address], [address], [place] [address]                                                         PHONE: [phone]    FAX: [phone] [address], [address], [place] [address]                                                 PHONE: [phone]    FAX: [phone]  *******************************************************************************    Reason for Consult: chest pain    HPI:  GRECIA MAYORGA is a 68y Female with cc of constant left sided chest pain x several days with radiation to left arm. Pt reports she has neck problems (requiring cervical epidurals) and cannot lift her left arm. Symptoms were mild to moderate in intensity and described as a pressure. On day of presentation, pt developed SOB. Pt reports symptoms have resolved today. Hx of HTN, HL and diabetes as well as thyroid dysfunction. Trying to discontinue smoking. Denies COPD. Denies PND or orthopnea, dizziness or syncope. No fever, chills, URI or constitutional symptoms.  Reports recent negative ischemic evaluation. Past hx of SVT ablation.    PAST MEDICAL & SURGICAL HISTORY:  Diabetes  Tachycardia: Not sure of specific name of heart irregularity  Hypertension  Hypothyroid  Depression  GERD (gastroesophageal reflux disease)  History of appendectomy  Bilateral ovarian cysts: left oopherectomy  H/O small bowel obstruction: with 3 surgeries  cervical epidurals  SVT ablation    Allergies:  codeine (Unknown)  latex (Unknown)  niacin (Unknown)      MEDICATIONS  (STANDING):  glyBURIDE   Tablet. 2.5 milliGRAM(s) Oral with breakfast  levothyroxine 88 MICROGram(s) Oral daily  metoprolol succinate ER 50 milliGRAM(s) Oral daily  pantoprazole    Tablet 40 milliGRAM(s) Oral before breakfast    MEDICATIONS  (PRN):  acetaminophen   Tablet .. 650 milliGRAM(s) Oral every 6 hours PRN Mild Pain (1 - 3), Moderate Pain (4 - 6), Severe Pain (7 - 10)  zolpidem 5 milliGRAM(s) Oral at bedtime PRN Insomnia      Social History: active tobacco /no EtOH / IVDA    Family History: Mother had CHF in her 60's and passed of renal CA at age 79. Sister diabetic with CHF. Pt is not in contact with her father.    ROS: All pertinent positive and negative ROS as noted above, otherwise all other 12 pt ROS is unremarkable.    Vital Signs Last 24 Hrs  T(C): 36.7 (11 Jan 2019 03:57), Max: 36.7 (10 Nilson 2019 20:02)  T(F): 98 (11 Jan 2019 03:57), Max: 98.1 (10 Nilson 2019 20:02)  HR: 66 (11 Jan 2019 06:06) (58 - 86)  BP: 148/80 (11 Jan 2019 06:06) (126/79 - 150/76)  BP(mean): --  RR: 16 (11 Jan 2019 03:57) (16 - 18)  SpO2: 95% (11 Jan 2019 03:57) (95% - 100%)    I&O's Detail    I&O's Summary          PHYSICAL EXAM:  General: Appears well developed, well nourished, no acute distress  HEENT: Head: normocephalic, atraumatic  Eyes: Pupils equal and reactive  Neck: Supple, no carotid bruit, no JVD, no HJR  CARDIOVASCULAR: Normal S1 and S2, no murmur, rub, or gallop  LUNGS: Clear to auscultation bilaterally, no rales, rhonchi or wheeze  ABDOMEN: Soft, nontender, non-distended, positive bowel sounds, no mass or bruit  EXTREMITIES: No edema, distal pulses WNL  SKIN: Warm and dry with normal turgor  NEURO: Alert & oriented x 3, grossly intact  PSYCH: normal mood and affect      LABS:                        12.9   6.4   )-----------( 342      ( 10 Nilson 2019 23:49 )             38.8     01-10    141  |  102  |  17.0  ----------------------------<  124<H>  3.8   |  26.0  |  0.82    Ca    8.9      10 Nilson 2019 23:49  Mg     1.8     01-10    TPro  6.7  /  Alb  3.7  /  TBili  0.3<L>  /  DBili  x   /  AST  22  /  ALT  13  /  AlkPhos  72  01-10    CARDIAC MARKERS ( 11 Jan 2019 04:20 )  x     / <0.01 ng/mL / x     / x     / x      CARDIAC MARKERS ( 10 Nilson 2019 23:49 )  x     / <0.01 ng/mL / x     / x     / x          PT/INR - ( 10 Nilson 2019 23:49 )   PT: 10.9 sec;   INR: 0.95 ratio         PTT - ( 10 Nilson 2019 23:49 )  PTT:27.9 sec    RADIOLOGY & ADDITIONAL STUDIES:    ECG: SR. Tw inversion V1-3 (also noted on office ECG's)    < from: CT Angio Chest w/ IV Cont (01.11.19 @ 03:35) >  IMPRESSION:    No evidence of pulmonary embolism.  Bibasilar streaky atelectasis.    < end of copied text >        Assessment and Plan:  In summary, GRECIA MAYORGA is a 68y Female with past medical history significant for cc of constant left sided chest pain x several days with radiation to left arm. Pt reports she has neck problems (requiring cervical epidurals) and cannot lift her left arm. Symptoms were mild to moderate in intensity and described as a pressure. On day of presentation, pt developed SOB. Pt reports symptoms have resolved today. Hx of HTN and diabetes as well as thyroid dysfunction. Trying to discontinue smoking. Denies COPD. Denies PND or orthopnea, dizziness or syncope. No fever, chills, URI or constitutional symptoms. Reports recent negative ischemic evaluation.  Past hx of SVT ablation    Nuclear stress 7/11/18 no ischemia. EF 77%  Echo 3/1/18 EF 70-75%. Tr MR/TR    - Office records reviewed    - Atypical CP. CE negative x 2. Mild increased D dimer. CTA without evidence of PE. Pt with cervical spine disease requiring epidural injections. Symptoms suggest musculoskeletal pain. Would maintain ASA for now. Pt may pursue outpt ischemic al due to no documented ischemia, negative cardiac enzymes, normal LV function and recent negative ischemic evaluation    - Chronically abnormal ECG. ECG compared to prior office ECG's and is essentially unchanged    - HTN. Maintain toprol and norvasc. BP's have been reviewed.    - Glycemic control    - Nicotine dependence. Smoking cessation dw pt    - No evidence of acute pulmonic process or PE    - May DC home from the cardiac standpoint with outpt fu via our office in one week.

## 2019-01-28 ENCOUNTER — APPOINTMENT (OUTPATIENT)
Dept: ORTHOPEDIC SURGERY | Facility: CLINIC | Age: 69
End: 2019-01-28
Payer: MEDICARE

## 2019-01-28 PROCEDURE — 99213 OFFICE O/P EST LOW 20 MIN: CPT

## 2019-01-28 NOTE — ADDENDUM
[FreeTextEntry1] : I, Amauri Sauceda, acted solely as a scribe for Dr. Joe Shepard on this date 01/28/2019 .

## 2019-01-28 NOTE — REASON FOR VISIT
[Follow-Up Visit] : a follow-up visit for [Knee Pain] : knee pain [FreeTextEntry2] : Left knee pain. Here to discuss MRI results.

## 2019-01-28 NOTE — PHYSICAL EXAM
[LE] : Sensory: Intact in bilateral lower extremities [ALL] : dorsalis pedis, posterior tibial, femoral, popliteal, and radial 2+ and symmetric bilaterally [Normal] : Oriented to person, place, and time, insight and judgement were intact and the affect was normal [Poor Appearance] : well-appearing [Acute Distress] : not in acute distress [Obese] : not obese [de-identified] : GENERAL APPEARANCE: Well nourished and hydrated, pleasant, alert, and oriented x 3. Appears their stated age. \par HEENT: Normocephalic, extraocular eye motion intact. Nasal septum midline. Oral cavity clear. External auditory canal clear. \par RESPIRATORY: Breath sounds clear and audible in all lobes. No wheezing, No accessory muscle use.\par CARDIOVASCULAR: No apparent abnormalities. No lower leg edema. No varicosities. Pedal pulses are palpable.\par NEUROLOGIC: Sensation is normal, no muscle weakness in the upper or lower extremities.\par DERMATOLOGIC: No apparent skin lesions, moist, warm, no rash.\par SPINE: Cervical spine appears normal and moves freely; thoracic spine appears normal and moves freely; lumbosacral spine appears normal and moves freely, normal, nontender.\par MUSCULOSKELETAL: Hands, wrists, and elbows are normal and move freely, shoulders are normal and move freely. [de-identified] : Right hip examination demonstrates exquisite tenderness over the bursae without stiffness or limited ROM, neg stinchfiled\par Bilateral knees show no effusion full range of motion exquisite tenderness to palpation along the patella and medial joint line no instability\par  [de-identified] : MRI of the left knee at [facility] 10/30/2018 shows lateral meniscus degenerated anterior horn, peripheral tear without evidence for articular surface extension. Trace Lanier's cyst.

## 2019-01-28 NOTE — DISCUSSION/SUMMARY
[de-identified] : 68 year old presents with partial tear lateral meniscus left knee, mild tricompartmental osteoarthritis of the bilateral knees, right hip bursitis. We talked about the nature of the condition and treatment options. She is doing well no with her right hip, so she will continue nonoperative treatment at this time. Pt will follow up here prn for injections. \par \par The percentages of success in an arthroscopy that involves a torn meniscus and arthritic changes is dependent upon how bad the arthritic changes are. Basically, removing a meniscal tear allows us to ascertain how bad the patient's articular cartilage destruction (arthritis) is. The arthroscopy cleans out any debris from the arthritic process as well as removing the meniscal tear. Approximately 75% of the patients will say that they feel relief, although their x-rays will continue to show significant arthritic changes. Arthroscopy for arthritis is a temporizing procedure, yielding subjective success (patient satisfaction) for less than two to five years. In some cases, the knee might eventually require a knee replacement for symptomatic relief. The prognostic factors that are somewhat favorable predictive values in arthroscopic debridements (removal of loose articular cartilage, loose body and inflamed synovium) of an arthritic knee are: short duration of symptoms, effusion (swelling), minimal deformity and good range of motion. The complications with any arthroscopy include the risk of anaesthetic complications and death, blood clots and pulmonary embolus, infection (less than 1%), nerve damage, by which we would mean a peroneal palsy (less than 0.1%) (small area of skin numbness is so common, we do not consider its presence a complication), injury to the popliteal artery, which is so rare that there are no statistics, but should it occur could theoretically lead to amputation, which is extremely unlikely. There is often a chance of getting a hemarthrosis (blood in the joint) but this usually resolves with local measures of icing, physical therapy, and aspiration. Reflex sympathetic dystrophy (RSD) is another extremely rare but theoretical complication. This (RSD) means that the patient has a stiff painful joint that is out of proportion to the objective pathology of the knee. Subsequently, it might require years of physical therapy before one regains a functional knee with RSD. Infrapatellar contracture syndrome (stiff joint) is sometimes reported and associated with RSD, but it usually is a result of not being aggressive in physical therapy. I think the patient understands the risk benefit ratio of arthroscopy and will think about whether they would prefer the nonoperative or surgical treatment option.

## 2019-01-28 NOTE — HISTORY OF PRESENT ILLNESS
[Stable] : stable [de-identified] : 68 year old female presents for follow-up evaluation of left knee pain and review of MRI results. The patient also notes bilateral knee pain with the left knee worse than the right, which started about 6-8 weeks ago. She reports difficulty walking from the knees. She states the left knee has been swelling significantly. She has trouble with stairs and prolonged walking or any squatting. Cortisone injection given last visit, on 10/15/18, provided good pain relief. She is doing well after her right hip injection as well. Pt has chronic lower back pain treated with intermittent injections by Dr. Medina.

## 2019-02-05 ENCOUNTER — OTHER (OUTPATIENT)
Age: 69
End: 2019-02-05

## 2019-05-30 ENCOUNTER — APPOINTMENT (OUTPATIENT)
Dept: RHEUMATOLOGY | Facility: CLINIC | Age: 69
End: 2019-05-30

## 2019-08-07 ENCOUNTER — OTHER (OUTPATIENT)
Age: 69
End: 2019-08-07

## 2019-08-19 ENCOUNTER — APPOINTMENT (OUTPATIENT)
Dept: ORTHOPEDIC SURGERY | Facility: CLINIC | Age: 69
End: 2019-08-19
Payer: MEDICARE

## 2019-08-19 VITALS
BODY MASS INDEX: 22.15 KG/M2 | WEIGHT: 125 LBS | HEIGHT: 63 IN | SYSTOLIC BLOOD PRESSURE: 142 MMHG | HEART RATE: 82 BPM | DIASTOLIC BLOOD PRESSURE: 84 MMHG

## 2019-08-19 DIAGNOSIS — M18.11 UNILATERAL PRIMARY OSTEOARTHRITIS OF FIRST CARPOMETACARPAL JOINT, RIGHT HAND: ICD-10-CM

## 2019-08-19 PROCEDURE — 99212 OFFICE O/P EST SF 10 MIN: CPT

## 2019-08-19 RX ORDER — CEVIMELINE HYDROCHLORIDE 30 MG/1
30 CAPSULE ORAL
Refills: 0 | Status: ACTIVE | COMMUNITY

## 2019-08-19 NOTE — REVIEW OF SYSTEMS
[Joint Pain] : joint pain [Joint Swelling] : joint swelling [Negative] : Heme/Lymph [FreeTextEntry9] : left knee. right hip

## 2019-08-19 NOTE — DISCUSSION/SUMMARY
[de-identified] : 68 year old presents with partial tear lateral meniscus left knee, mild tricompartmental osteoarthritis of the bilateral knees, right hip bursitis. We talked about the nature of the condition and treatment options. She is doing well no with her right hip, so she will continue nonoperative treatment at this time. Pt will follow up here prn for injections. I referred pt to see dr ha for right cmc joint arthritis and dr rivera for left foot pain.\par \par The percentages of success in an arthroscopy that involves a torn meniscus and arthritic changes is dependent upon how bad the arthritic changes are. Basically, removing a meniscal tear allows us to ascertain how bad the patient's articular cartilage destruction (arthritis) is. The arthroscopy cleans out any debris from the arthritic process as well as removing the meniscal tear. Approximately 75% of the patients will say that they feel relief, although their x-rays will continue to show significant arthritic changes. Arthroscopy for arthritis is a temporizing procedure, yielding subjective success (patient satisfaction) for less than two to five years. In some cases, the knee might eventually require a knee replacement for symptomatic relief. The prognostic factors that are somewhat favorable predictive values in arthroscopic debridements (removal of loose articular cartilage, loose body and inflamed synovium) of an arthritic knee are: short duration of symptoms, effusion (swelling), minimal deformity and good range of motion. The complications with any arthroscopy include the risk of anaesthetic complications and death, blood clots and pulmonary embolus, infection (less than 1%), nerve damage, by which we would mean a peroneal palsy (less than 0.1%) (small area of skin numbness is so common, we do not consider its presence a complication), injury to the popliteal artery, which is so rare that there are no statistics, but should it occur could theoretically lead to amputation, which is extremely unlikely. There is often a chance of getting a hemarthrosis (blood in the joint) but this usually resolves with local measures of icing, physical therapy, and aspiration. Reflex sympathetic dystrophy (RSD) is another extremely rare but theoretical complication. This (RSD) means that the patient has a stiff painful joint that is out of proportion to the objective pathology of the knee. Subsequently, it might require years of physical therapy before one regains a functional knee with RSD. Infrapatellar contracture syndrome (stiff joint) is sometimes reported and associated with RSD, but it usually is a result of not being aggressive in physical therapy. I think the patient understands the risk benefit ratio of arthroscopy and will think about whether they would prefer the nonoperative or surgical treatment option. \par  \par

## 2019-08-19 NOTE — HISTORY OF PRESENT ILLNESS
[Stable] : stable [Pain Location] : pain [2] : a current pain level of 2/10 [0] : a minimum pain level of 0/10 [1] : an average pain level of 1/10 [4] : a maximum pain level of 4/10 [Standing] : standing [Daily] : ~He/She~ states the symptoms seem to be occuring daily [Hip Movement] : worsened by hip movement [Walking] : worsened by walking [NSAIDs] : relieved by nonsteroidal anti-inflammatory drugs [Rest] : relieved by rest [de-identified] : 68 year old female presents for follow-up evaluation of left knee and right hip apin. she had MRI 1/2019 with partial tear of lateral meniscus. no other internal derangement.  she reports INSTABILITY, feels like the knee cap slips laterally.  She reports difficulty walking from the knees. pt denies actual knee cap dislocation.\par  She states the left knee has been swelling significantly. She has trouble with stairs and prolonged walking or any squatting. Cortisone injection given last visit, on 10/15/18, provided good pain relief. \par She was having severe  right hip pain but now better after using cream for hand arthritis. Pt has chronic lower back pain treated with intermittent injections by Dr. Medina. \par \par PT was not too helpful \par \par pt c/o right base of thumb pain and left dorsal and lateral foot pain and swelling. [Knee Flexion] : not worsened by knee flexion [Physical Therapy] : not relieved by physical therapy

## 2019-09-13 ENCOUNTER — APPOINTMENT (OUTPATIENT)
Dept: ORTHOPEDIC SURGERY | Facility: CLINIC | Age: 69
End: 2019-09-13
Payer: MEDICARE

## 2019-09-13 VITALS
HEART RATE: 69 BPM | SYSTOLIC BLOOD PRESSURE: 151 MMHG | WEIGHT: 125 LBS | DIASTOLIC BLOOD PRESSURE: 85 MMHG | BODY MASS INDEX: 22.15 KG/M2 | HEIGHT: 63 IN

## 2019-09-13 DIAGNOSIS — M79.672 PAIN IN LEFT FOOT: ICD-10-CM

## 2019-09-13 PROCEDURE — 73630 X-RAY EXAM OF FOOT: CPT | Mod: LT

## 2019-09-13 PROCEDURE — 99214 OFFICE O/P EST MOD 30 MIN: CPT

## 2019-09-13 PROCEDURE — 73610 X-RAY EXAM OF ANKLE: CPT | Mod: LT

## 2019-09-13 NOTE — PHYSICAL EXAM
[de-identified] : The patient appears well nourished and in no apparent distress. The patient is alert and oriented to person, place, and time. Affect and mood appear normal. The head is normocephalic and atraumatic. The eyes reveal normal sclera and extra ocular muscles are intact. The mucous membranes are moist. Skin shows normal turgor with no evidence of eczema or psoriasis. No respiratory distress noted. Sensation grossly intact. MUSCULOSKELETAL:   SEE BELOW\par \par \par Left foot and ankle exam demonstrates skin is clean, dry and intact. No acute surgical scars. No signs of acute trauma. No erythema. No ecchymosis. No soft tissue swelling. Normal alignment of the foot and ankle. Achilles intact and nontender. There is tenderness of the plantar calcaneus. There is some tenderness over the area of the head of the fifth metatarsus. No medial foot tenderness. No tenderness of the toes. Ankle motion demonstrates 7° of dorsiflexion and approximately 40° of plantarflexion. His good inversion and eversion. There is good strength against resisted ankle motions. Normal sensation to light touch. No venous stasis. [de-identified] : X-ray of the left ankle and foot are reviewed. The ankle space is preserved. No significant arthritic changes are appreciated. No ankle retained hardware. The left foot x-rays demonstrate hardware of the first distal metatarsals. No acute fractures. Fragmentation of the os perineum is appreciated. Degenerative changes of the calcaneal cuboid is also appreciated.

## 2019-09-13 NOTE — DISCUSSION/SUMMARY
[de-identified] : X-rays reviewed to patient. Patient is sent for an MRI of the left ankle to further evaluate the fragmentation of the os perineum as well as degenerative changes. The patient will complete the study returned back to the office to discuss the findings. The patient will continue with conservative activities. The patient demonstrated understanding of the treatment plan.

## 2019-09-13 NOTE — HISTORY OF PRESENT ILLNESS
[de-identified] : Patient presents today with granddaughter for evaluation of left foot pain. The patient reports having pain for over a year and half. She has been seen by for podiatrist over the past year. She has not been given a specific diagnosis. She reports her pain is in the plantar heel and lateral foot area. She at least 4 different injections. Last advanced imaging study was approximately February 2018. She was told that she had some possible ligamentous injury and possible stress fracture. She's been treated in both a short-leg cast as well as a CAM boot. Neither of those modalities have improved her pain. She continues to have discomfort. She has intermittent swelling. She reports difficulty walking long distances that time. She does not regularly take anti-inflammatories for this pain. No sensory changes are reported. No motor weaknesses or associated. She reports of left foot podiatric surgery about 30 years ago. \par \par Review of Systems-\par Constitutional: No fever or chills. \par Cardiovascular: No orthopnea or chest pain\par Pulmonary: No shortness of breath. \par GI: No nausea or vomiting or abdominal pain.\par Musculoskeletal: see HPI \par Psychiatric: No anxiety and depression.

## 2019-09-18 ENCOUNTER — FORM ENCOUNTER (OUTPATIENT)
Age: 69
End: 2019-09-18

## 2019-09-19 ENCOUNTER — APPOINTMENT (OUTPATIENT)
Dept: MRI IMAGING | Facility: CLINIC | Age: 69
End: 2019-09-19
Payer: MEDICARE

## 2019-09-19 ENCOUNTER — OUTPATIENT (OUTPATIENT)
Dept: OUTPATIENT SERVICES | Facility: HOSPITAL | Age: 69
LOS: 1 days | End: 2019-09-19
Payer: MEDICARE

## 2019-09-19 DIAGNOSIS — M79.672 PAIN IN LEFT FOOT: ICD-10-CM

## 2019-09-19 DIAGNOSIS — Z87.19 PERSONAL HISTORY OF OTHER DISEASES OF THE DIGESTIVE SYSTEM: Chronic | ICD-10-CM

## 2019-09-19 DIAGNOSIS — Z00.00 ENCOUNTER FOR GENERAL ADULT MEDICAL EXAMINATION WITHOUT ABNORMAL FINDINGS: ICD-10-CM

## 2019-09-19 DIAGNOSIS — N83.20 UNSPECIFIED OVARIAN CYSTS: Chronic | ICD-10-CM

## 2019-09-19 DIAGNOSIS — Z98.89 OTHER SPECIFIED POSTPROCEDURAL STATES: Chronic | ICD-10-CM

## 2019-09-19 PROCEDURE — 73721 MRI JNT OF LWR EXTRE W/O DYE: CPT

## 2019-09-19 PROCEDURE — 73721 MRI JNT OF LWR EXTRE W/O DYE: CPT | Mod: 26,LT

## 2020-02-27 ENCOUNTER — EMERGENCY (EMERGENCY)
Facility: HOSPITAL | Age: 70
LOS: 1 days | Discharge: HOME | End: 2020-02-27
Attending: EMERGENCY MEDICINE
Payer: MEDICARE

## 2020-02-27 VITALS
SYSTOLIC BLOOD PRESSURE: 163 MMHG | HEART RATE: 90 BPM | WEIGHT: 126.99 LBS | TEMPERATURE: 98 F | OXYGEN SATURATION: 99 % | RESPIRATION RATE: 20 BRPM | DIASTOLIC BLOOD PRESSURE: 81 MMHG

## 2020-02-27 DIAGNOSIS — Z98.89 OTHER SPECIFIED POSTPROCEDURAL STATES: Chronic | ICD-10-CM

## 2020-02-27 DIAGNOSIS — Z87.19 PERSONAL HISTORY OF OTHER DISEASES OF THE DIGESTIVE SYSTEM: Chronic | ICD-10-CM

## 2020-02-27 DIAGNOSIS — N83.20 UNSPECIFIED OVARIAN CYSTS: Chronic | ICD-10-CM

## 2020-02-27 LAB
ALBUMIN SERPL ELPH-MCNC: 3.6 G/DL — SIGNIFICANT CHANGE UP (ref 3.3–5.2)
ALP SERPL-CCNC: 94 U/L — SIGNIFICANT CHANGE UP (ref 40–120)
ALT FLD-CCNC: 26 U/L — SIGNIFICANT CHANGE UP
ANION GAP SERPL CALC-SCNC: 13 MMOL/L — SIGNIFICANT CHANGE UP (ref 5–17)
AST SERPL-CCNC: 26 U/L — SIGNIFICANT CHANGE UP
BASOPHILS # BLD AUTO: 0 K/UL — SIGNIFICANT CHANGE UP (ref 0–0.2)
BASOPHILS NFR BLD AUTO: 0 % — SIGNIFICANT CHANGE UP (ref 0–2)
BILIRUB SERPL-MCNC: 0.2 MG/DL — LOW (ref 0.4–2)
BUN SERPL-MCNC: 25 MG/DL — HIGH (ref 8–20)
CALCIUM SERPL-MCNC: 9.1 MG/DL — SIGNIFICANT CHANGE UP (ref 8.6–10.2)
CHLORIDE SERPL-SCNC: 103 MMOL/L — SIGNIFICANT CHANGE UP (ref 98–107)
CO2 SERPL-SCNC: 26 MMOL/L — SIGNIFICANT CHANGE UP (ref 22–29)
CREAT SERPL-MCNC: 1.02 MG/DL — SIGNIFICANT CHANGE UP (ref 0.5–1.3)
EOSINOPHIL # BLD AUTO: 0.36 K/UL — SIGNIFICANT CHANGE UP (ref 0–0.5)
EOSINOPHIL NFR BLD AUTO: 2.6 % — SIGNIFICANT CHANGE UP (ref 0–6)
GIANT PLATELETS BLD QL SMEAR: PRESENT — SIGNIFICANT CHANGE UP
GLUCOSE SERPL-MCNC: 93 MG/DL — SIGNIFICANT CHANGE UP (ref 70–99)
HCT VFR BLD CALC: 39.1 % — SIGNIFICANT CHANGE UP (ref 34.5–45)
HGB BLD-MCNC: 12.5 G/DL — SIGNIFICANT CHANGE UP (ref 11.5–15.5)
LYMPHOCYTES # BLD AUTO: 19.2 % — SIGNIFICANT CHANGE UP (ref 13–44)
LYMPHOCYTES # BLD AUTO: 2.68 K/UL — SIGNIFICANT CHANGE UP (ref 1–3.3)
MANUAL SMEAR VERIFICATION: SIGNIFICANT CHANGE UP
MCHC RBC-ENTMCNC: 30.4 PG — SIGNIFICANT CHANGE UP (ref 27–34)
MCHC RBC-ENTMCNC: 32 GM/DL — SIGNIFICANT CHANGE UP (ref 32–36)
MCV RBC AUTO: 95.1 FL — SIGNIFICANT CHANGE UP (ref 80–100)
MONOCYTES # BLD AUTO: 1.09 K/UL — HIGH (ref 0–0.9)
MONOCYTES NFR BLD AUTO: 7.8 % — SIGNIFICANT CHANGE UP (ref 2–14)
NEUTROPHILS # BLD AUTO: 9.46 K/UL — HIGH (ref 1.8–7.4)
NEUTROPHILS NFR BLD AUTO: 67.8 % — SIGNIFICANT CHANGE UP (ref 43–77)
NT-PROBNP SERPL-SCNC: 180 PG/ML — SIGNIFICANT CHANGE UP (ref 0–300)
PLAT MORPH BLD: NORMAL — SIGNIFICANT CHANGE UP
PLATELET # BLD AUTO: 428 K/UL — HIGH (ref 150–400)
POTASSIUM SERPL-MCNC: 4.3 MMOL/L — SIGNIFICANT CHANGE UP (ref 3.5–5.3)
POTASSIUM SERPL-SCNC: 4.3 MMOL/L — SIGNIFICANT CHANGE UP (ref 3.5–5.3)
PROT SERPL-MCNC: 7 G/DL — SIGNIFICANT CHANGE UP (ref 6.6–8.7)
RBC # BLD: 4.11 M/UL — SIGNIFICANT CHANGE UP (ref 3.8–5.2)
RBC # FLD: 15.2 % — HIGH (ref 10.3–14.5)
RBC BLD AUTO: NORMAL — SIGNIFICANT CHANGE UP
SODIUM SERPL-SCNC: 142 MMOL/L — SIGNIFICANT CHANGE UP (ref 135–145)
TROPONIN T SERPL-MCNC: <0.01 NG/ML — SIGNIFICANT CHANGE UP (ref 0–0.06)
VARIANT LYMPHS # BLD: 2.6 % — SIGNIFICANT CHANGE UP (ref 0–6)
WBC # BLD: 13.96 K/UL — HIGH (ref 3.8–10.5)
WBC # FLD AUTO: 13.96 K/UL — HIGH (ref 3.8–10.5)

## 2020-02-27 PROCEDURE — 71045 X-RAY EXAM CHEST 1 VIEW: CPT | Mod: 26

## 2020-02-27 PROCEDURE — 99220: CPT

## 2020-02-27 PROCEDURE — 93010 ELECTROCARDIOGRAM REPORT: CPT

## 2020-02-27 RX ORDER — PANTOPRAZOLE SODIUM 20 MG/1
40 TABLET, DELAYED RELEASE ORAL
Refills: 0 | Status: DISCONTINUED | OUTPATIENT
Start: 2020-02-28 | End: 2020-03-03

## 2020-02-27 RX ORDER — DEXTROSE 50 % IN WATER 50 %
12.5 SYRINGE (ML) INTRAVENOUS ONCE
Refills: 0 | Status: DISCONTINUED | OUTPATIENT
Start: 2020-02-27 | End: 2020-03-03

## 2020-02-27 RX ORDER — LEVOTHYROXINE SODIUM 125 MCG
75 TABLET ORAL DAILY
Refills: 0 | Status: DISCONTINUED | OUTPATIENT
Start: 2020-02-28 | End: 2020-03-03

## 2020-02-27 RX ORDER — ASPIRIN/CALCIUM CARB/MAGNESIUM 324 MG
325 TABLET ORAL ONCE
Refills: 0 | Status: COMPLETED | OUTPATIENT
Start: 2020-02-27 | End: 2020-02-27

## 2020-02-27 RX ORDER — DEXTROSE 50 % IN WATER 50 %
25 SYRINGE (ML) INTRAVENOUS ONCE
Refills: 0 | Status: DISCONTINUED | OUTPATIENT
Start: 2020-02-27 | End: 2020-03-03

## 2020-02-27 RX ORDER — AMLODIPINE BESYLATE 2.5 MG/1
10 TABLET ORAL DAILY
Refills: 0 | Status: DISCONTINUED | OUTPATIENT
Start: 2020-02-28 | End: 2020-03-03

## 2020-02-27 RX ORDER — CLOPIDOGREL BISULFATE 75 MG/1
75 TABLET, FILM COATED ORAL DAILY
Refills: 0 | Status: DISCONTINUED | OUTPATIENT
Start: 2020-02-28 | End: 2020-03-03

## 2020-02-27 RX ORDER — DEXTROSE 50 % IN WATER 50 %
15 SYRINGE (ML) INTRAVENOUS ONCE
Refills: 0 | Status: DISCONTINUED | OUTPATIENT
Start: 2020-02-27 | End: 2020-03-03

## 2020-02-27 RX ORDER — ASPIRIN/CALCIUM CARB/MAGNESIUM 324 MG
81 TABLET ORAL DAILY
Refills: 0 | Status: DISCONTINUED | OUTPATIENT
Start: 2020-02-28 | End: 2020-03-03

## 2020-02-27 RX ORDER — ZOLPIDEM TARTRATE 10 MG/1
5 TABLET ORAL AT BEDTIME
Refills: 0 | Status: DISCONTINUED | OUTPATIENT
Start: 2020-02-27 | End: 2020-02-28

## 2020-02-27 RX ORDER — GLUCAGON INJECTION, SOLUTION 0.5 MG/.1ML
1 INJECTION, SOLUTION SUBCUTANEOUS ONCE
Refills: 0 | Status: DISCONTINUED | OUTPATIENT
Start: 2020-02-27 | End: 2020-03-03

## 2020-02-27 RX ORDER — SODIUM CHLORIDE 9 MG/ML
1000 INJECTION, SOLUTION INTRAVENOUS
Refills: 0 | Status: DISCONTINUED | OUTPATIENT
Start: 2020-02-27 | End: 2020-03-03

## 2020-02-27 RX ORDER — METOPROLOL TARTRATE 50 MG
50 TABLET ORAL
Refills: 0 | Status: DISCONTINUED | OUTPATIENT
Start: 2020-02-27 | End: 2020-03-03

## 2020-02-27 RX ADMIN — Medication 325 MILLIGRAM(S): at 22:06

## 2020-02-27 NOTE — ED PROVIDER NOTE - OBJECTIVE STATEMENT
68yo F pmhx HTN, HLD, hypothyroid, GERD, DMII presents to ED c/o chest pressure and SOB onset 1730 pm this evening. Pt describing sensation of elephant sitting on her chest, with associated SOB. Chest pain is constant, non-radiating, worsening since 1730. Pt states she was at [facility] approx 5 weeks ago, had a "clot in right leg", states she had a femoral catheretization to "remove the clot". Does not believe any stents were placed, but was placed on plavix upon dc. Pt also noting recent upper respiratory infection, noting coughing, throat pain and occasional epistaxis including earlier today which has since resolved. Pt is former smoker, quit 2 years ago. Does not drink alcohol. Denies fever, chills, headache, palpitations, syncope, abdominal pain, n/v/d.   Cardiologist: Dr. Zhou, [facility]

## 2020-02-27 NOTE — ED ADULT TRIAGE NOTE - CHIEF COMPLAINT QUOTE
Patient is alert and oriented x4 c/o shortness of breath that started today with chest pain while patient was at rest. per patient had a cardiac cath 5 weeks ago at [facility]. on plavix. denies abd pain or n/v/d. color normal for ethnicity. breathing unlabored.

## 2020-02-27 NOTE — ED ADULT NURSE NOTE - OBJECTIVE STATEMENT
pt presents to the ed a&ox3 c/o chest pain and sob that began today. pt states she had cardiac cath 5 wks ago. pt placed on cardiac monitor, vitals stable, pt safety maintained, daughter at bedside.

## 2020-02-27 NOTE — ED PROVIDER NOTE - PHYSICAL EXAMINATION
Const: Awake, alert and oriented. In no acute distress. Well appearing.  HEENT: NC/AT. Moist mucous membranes. Dried blood in left nares. Oropharynx non-erythematous, uvula midline  Eyes: No scleral icterus. EOMI.  Neck:. Soft and supple. Full ROM without pain.  Cardiac: Regular rate and regular rhythm. +S1/S2. Peripheral pulses 2+ and symmetric. No LE edema.  Resp: Speaking in full sentences. No evidence of respiratory distress. No wheezes, rales or rhonchi.  Abd: Soft, non-tender, non-distended. Normal bowel sounds in all 4 quadrants. No guarding or rebound.  Back: Spine midline and non-tender. No CVAT.  Skin: No rashes, abrasions or lacerations.  Neuro: Awake, alert & oriented x 3. Moves all extremities symmetrically.

## 2020-02-27 NOTE — ED CDU PROVIDER INITIAL DAY NOTE - OBJECTIVE STATEMENT
68 y/o F PMHx HTN, HLD, hypothyroid, GERD, DMII presents to ED c/o chest pressure and SOB onset 1730 pm this evening. Pt describing sensation of elephant sitting on her chest, with associated SOB. Chest pain is constant, non-radiating, worsening since 1730. Pt states she was at [facility] approx 5 weeks ago, had a "clot in right leg", states she had a femoral catheretization to "remove the clot". Does not believe any stents were placed, but was placed on plavix upon discharge. Pt also noting recent upper respiratory infection, noting coughing, throat pain and occasional epistaxis including earlier today which has since resolved. Pt is former smoker, quit 2 years ago. Does not drink alcohol. Denies fever, chills, headache, palpitations, syncope, abdominal pain, n/v/d.   Cardiologist: Dr. Zhou, [facility]

## 2020-02-27 NOTE — ED PROVIDER NOTE - ATTENDING CONTRIBUTION TO CARE
Natacha: I performed a face to face bedside interview with patient regarding history of present illness, review of symptoms and past medical history. I completed an independent physical exam.  I have discussed patient's plan of care with advanced care provider.   I agree with note as stated above including HISTORY OF PRESENT ILLNESS, HIV, PAST MEDICAL/SURGICAL/FAMILY/SOCIAL HISTORY, ALLERGIES AND HOME MEDICATIONS, REVIEW OF SYSTEMS, PHYSICAL EXAM, MEDICAL DECISION MAKING and any PROGRESS NOTES during the time I functioned as the attending physician for this patient  unless otherwise noted. My brief assessment is as follows: 69F h/o HTN, HLD, hypothyroid, DM p/w chest pressure and SOB since 1730. Had a procedure at [facility] 5 weeks ago on her R leg, pt unsure what procedure. URI symptoms for last few weeks, last fever a weeks ago. Last stress test 1-2 years ago. Cards: [facility].   Gen: Well appearing in NAD  Head: NC/AT  Neck: trachea midline  Resp:  No distress, CTAB  CV: RRR  Ext: no deformities, no LE edema  Neuro:  A&O appears non focal  Skin:  Warm and dry as visualized  Psych:  Normal affect and mood  Plan for labs, trop, cxr, ecg, reassess. Consider CDU for serial troponins/ECGs.

## 2020-02-27 NOTE — ED PROVIDER NOTE - CLINICAL SUMMARY MEDICAL DECISION MAKING FREE TEXT BOX
69y Female presenting with chest pressure, sob onset 1730pm. Plan to check ekg, labs, cxr. Likely obs for serial trop/ekg

## 2020-02-27 NOTE — ED STATDOCS - OBJECTIVE STATEMENT
69 yoF who is on Plavix; pmhx significant for Depression, Diabetes, GERD, Hypertension, Hypothyroid; now p/w shortness of breath and chest pressure that has been progressively worsening since 1730 today. Pt states that she feels as if she has "10 elephants sitting on my chest." Pt had a cath 5 weeks ago, does not believe she had any stents placed. She was recently sick with flu-like symptoms for about 3 weeks, and states that she has had difficulty speaking for the last 4 days. Denies fever, chills, diaphoresis. Denies nausea, vomiting. Denies dizziness, visual changes.   PMHx: Depression, Diabetes, GERD, Hypertension, Hypothyroid, small bowel obstruction, appendectomy  SOC: No tobacco/illicit substance use/socialEtOH  Focused eval, protocol orders entered. Pt to be moved to main ED for further evaluation by another provider.

## 2020-02-27 NOTE — ED CDU PROVIDER INITIAL DAY NOTE - MEDICAL DECISION MAKING DETAILS
68 y/o F with PMHx HTN, HLD, hypothyroid, GERD, DMII presents to ED c/o chest pressure and SOB. Pt placed in OBS for serial troponin, telemetry monitoring and cardiology consult in AM.

## 2020-02-27 NOTE — ED CDU PROVIDER INITIAL DAY NOTE - ATTENDING CONTRIBUTION TO CARE
69 uo female with CAD and risk factors p/w chest pain; place in obs for tele, serial enzymes, and cards consult in AM  I, Brett Martinez, participated in the care of this patient with the ACP. I discussed the history and physical exam findings as well as lab results and plan of care with the ACP. I agree with ACP's history, physical and assessment.

## 2020-02-28 VITALS
HEART RATE: 77 BPM | SYSTOLIC BLOOD PRESSURE: 112 MMHG | DIASTOLIC BLOOD PRESSURE: 82 MMHG | OXYGEN SATURATION: 98 % | RESPIRATION RATE: 18 BRPM

## 2020-02-28 LAB
APPEARANCE UR: CLEAR — SIGNIFICANT CHANGE UP
BACTERIA # UR AUTO: NEGATIVE — SIGNIFICANT CHANGE UP
BILIRUB UR-MCNC: NEGATIVE — SIGNIFICANT CHANGE UP
COLOR SPEC: YELLOW — SIGNIFICANT CHANGE UP
DIFF PNL FLD: ABNORMAL
EPI CELLS # UR: ABNORMAL
GLUCOSE UR QL: NEGATIVE MG/DL — SIGNIFICANT CHANGE UP
KETONES UR-MCNC: NEGATIVE — SIGNIFICANT CHANGE UP
LEUKOCYTE ESTERASE UR-ACNC: NEGATIVE — SIGNIFICANT CHANGE UP
NITRITE UR-MCNC: NEGATIVE — SIGNIFICANT CHANGE UP
PH UR: 6 — SIGNIFICANT CHANGE UP (ref 5–8)
PROT UR-MCNC: NEGATIVE MG/DL — SIGNIFICANT CHANGE UP
RBC CASTS # UR COMP ASSIST: ABNORMAL /HPF (ref 0–4)
SP GR SPEC: 1.01 — SIGNIFICANT CHANGE UP (ref 1.01–1.02)
TROPONIN T SERPL-MCNC: <0.01 NG/ML — SIGNIFICANT CHANGE UP (ref 0–0.06)
TROPONIN T SERPL-MCNC: <0.01 NG/ML — SIGNIFICANT CHANGE UP (ref 0–0.06)
UROBILINOGEN FLD QL: NEGATIVE MG/DL — SIGNIFICANT CHANGE UP
WBC UR QL: SIGNIFICANT CHANGE UP

## 2020-02-28 PROCEDURE — 83880 ASSAY OF NATRIURETIC PEPTIDE: CPT

## 2020-02-28 PROCEDURE — 81001 URINALYSIS AUTO W/SCOPE: CPT

## 2020-02-28 PROCEDURE — 85730 THROMBOPLASTIN TIME PARTIAL: CPT

## 2020-02-28 PROCEDURE — 71045 X-RAY EXAM CHEST 1 VIEW: CPT

## 2020-02-28 PROCEDURE — 99284 EMERGENCY DEPT VISIT MOD MDM: CPT | Mod: 25

## 2020-02-28 PROCEDURE — 85027 COMPLETE CBC AUTOMATED: CPT

## 2020-02-28 PROCEDURE — 85610 PROTHROMBIN TIME: CPT

## 2020-02-28 PROCEDURE — 84484 ASSAY OF TROPONIN QUANT: CPT

## 2020-02-28 PROCEDURE — 80053 COMPREHEN METABOLIC PANEL: CPT

## 2020-02-28 PROCEDURE — 36415 COLL VENOUS BLD VENIPUNCTURE: CPT

## 2020-02-28 PROCEDURE — G0378: CPT

## 2020-02-28 PROCEDURE — 93005 ELECTROCARDIOGRAM TRACING: CPT

## 2020-02-28 PROCEDURE — 99217: CPT

## 2020-02-28 PROCEDURE — 93010 ELECTROCARDIOGRAM REPORT: CPT

## 2020-02-28 RX ORDER — GLYBURIDE 5 MG
2.5 TABLET ORAL
Refills: 0 | Status: DISCONTINUED | OUTPATIENT
Start: 2020-02-28 | End: 2020-03-03

## 2020-02-28 RX ADMIN — Medication 2.5 MILLIGRAM(S): at 08:49

## 2020-02-28 RX ADMIN — AMLODIPINE BESYLATE 10 MILLIGRAM(S): 2.5 TABLET ORAL at 05:10

## 2020-02-28 RX ADMIN — PANTOPRAZOLE SODIUM 40 MILLIGRAM(S): 20 TABLET, DELAYED RELEASE ORAL at 05:06

## 2020-02-28 RX ADMIN — ZOLPIDEM TARTRATE 5 MILLIGRAM(S): 10 TABLET ORAL at 01:22

## 2020-02-28 RX ADMIN — Medication 75 MICROGRAM(S): at 08:49

## 2020-02-28 NOTE — ED ADULT NURSE REASSESSMENT NOTE - NS ED NURSE REASSESS COMMENT FT1
Dipti assumed from off-going RN Vicenta at this time. Pt is resting comfortably in stretcher on CM in NAD. Pt made aware she needs to give a Urine sample prior to DC. JOHN Her to come speak with pt and update her plan of care.

## 2020-02-28 NOTE — ED CDU PROVIDER DISPOSITION NOTE - NSFOLLOWUPINSTRUCTIONS_ED_ALL_ED_FT
Ananda Sandoval is a 65 year old male presenting with back pain    Denies Latex allergy or sensitivity.     There were no vitals taken for this visit.    No changes to patient's medical history or social history since their last visit.    ALLERGIES:   Allergen Reactions   • Imitrex [Sumatriptan Base] NAUSEA and MYALGIA   • Amantadine Hcl      hallucinations   • Amoxicillin      rash on his arms.   • Benzyl Alcohol       Vistaril-- same as demerol/ nausea and altered mentation   • Iv Immune Globulin (Zhou)      Back pain, chest pain, violent shaking   • Meperidine Hcl      Demerol--nausea, altered mentation   • Pravastatin      Muscle pains   • Simvastatin MYALGIA       Ananda notes that he currently is not a smoker.     1) GARGLE WITH WARN SALT WATER, TYLENOL OR IBUPROFEN FOR PAIN OT FEVER  2) SEE YOUR CARDIOLOGIST NEXT WEEK.

## 2020-02-28 NOTE — ED CDU PROVIDER SUBSEQUENT DAY NOTE - MEDICAL DECISION MAKING DETAILS
70 y/o F with PMHx HTN, HLD, hypothyroid, GERD, DMII presents to ED c/o chest pressure and SOB. Pt placed in OBS for serial troponin, telemetry monitoring and cardiology consult in AM.

## 2020-02-28 NOTE — ED CDU PROVIDER DISPOSITION NOTE - PATIENT PORTAL LINK FT
You can access the FollowMyHealth Patient Portal offered by [facility] by registering at the following website: [email or web address]. By joining [facility]’s FollowMyHealth portal, you will also be able to view your health information using other applications (apps) compatible with our system.

## 2020-02-28 NOTE — ED CDU PROVIDER DISPOSITION NOTE - CARE PROVIDER_API CALL
Sylvie Lerner)  Cardiovascular Disease; Internal Medicine  [address], [address]  [place] [address]  Phone: [phone]  Fax: [phone]  Follow Up Time: 7-10 Days

## 2020-02-28 NOTE — CONSULT NOTE ADULT - SUBJECTIVE AND OBJECTIVE BOX
[facility], [facility]                                                    [address], [address], [place] [address]                                                         PHONE: [phone]    FAX: [phone] [address], [address], [place] [address]                                                 PHONE: [phone]    FAX: [phone]  *******************************************************************************  cc: SOB    HPI:   69F with cc of 3 weeks URI symptoms associated with possible fever, cough productive of green sputum and SOB. Pressure sensation in central chest associated with coughing.  Pt reports loss of voice. Pt denies chest pain, palpitations, PND or orthopnea. No dizziness or syncope. Hx f SVT ablation. and carotid disease. Hx of Htn and HL. Former smoker, quit 2 years ago. Pt with recent claudication  with AILYN placed to mid Right SFA and PT of occluded pros right PRESTON 1/7/20. Hx of hypothyroidism, [place]. No nausea or vomiting or GI sx reported. Pt reports some associated nose bleeds.    Nuclear stress test 7/11/18 no ischemia EF 77%   Echo 1/2/20 EF 70-75%. diastolic dysfunction. Tr MR, mild TR  Carotid 1/2/20 16-49% bilaterally    Overnight events/Subjective Assessment: continued loss of voice and cough.    INTERPRETATION OF TELEMETRY (personally reviewed): SR no ectopy    PAST MEDICAL & SURGICAL HISTORY:  Diabetes  Tachycardia: Not sure of specific name of heart irregularity  Hypertension  Hypothyroid  Depression  GERD (gastroesophageal reflux disease)  History of appendectomy  Bilateral ovarian cysts: left oopherectomy  H/O small bowel obstruction: with 3 surgeries    Allergies:  codeine (Unknown)  latex (Unknown)  niacin (Unknown)      MEDICATIONS  (STANDING):  amLODIPine   Tablet 10 milliGRAM(s) Oral daily  aspirin enteric coated 81 milliGRAM(s) Oral daily  clopidogrel Tablet 75 milliGRAM(s) Oral daily  dextrose 5%. 1000 milliLiter(s) (50 mL/Hr) IV Continuous <Continuous>  dextrose 50% Injectable 12.5 Gram(s) IV Push once  dextrose 50% Injectable 25 Gram(s) IV Push once  dextrose 50% Injectable 25 Gram(s) IV Push once  glyBURIDE   Tablet. 2.5 milliGRAM(s) Oral with breakfast  levothyroxine 75 MICROGram(s) Oral daily  metoprolol tartrate 50 milliGRAM(s) Oral two times a day  pantoprazole    Tablet 40 milliGRAM(s) Oral before breakfast    MEDICATIONS  (PRN):  dextrose 40% Gel 15 Gram(s) Oral once PRN Blood Glucose LESS THAN 70 milliGRAM(s)/deciliter  glucagon  Injectable 1 milliGRAM(s) IntraMuscular once PRN Glucose LESS THAN 70 milligrams/deciliter  zolpidem 5 milliGRAM(s) Oral at bedtime PRN Insomnia      Vital Signs Last 24 Hrs  T(C): 36.4 (28 Feb 2020 02:30), Max: 36.7 (27 Feb 2020 20:13)  T(F): 97.6 (28 Feb 2020 02:30), Max: 98 (27 Feb 2020 20:13)  HR: 60 (28 Feb 2020 05:12) (60 - 90)  BP: 137/75 (28 Feb 2020 05:12) (100/60 - 163/81)  BP(mean): --  RR: 16 (28 Feb 2020 05:12) (15 - 20)  SpO2: 98% (28 Feb 2020 05:12) (98% - 99%)    I&O's Detail    I&O's Summary          PHYSICAL EXAM:  General: Appears well developed, well nourished, no acute distress. not in acute pain  HEAD: normal cephalic. Atraumatic  PUPILS: equal and reactive to light  EARS: normal hearing  NECK: supple. no JVD or HJR. no carotid bruits. no visible lymphadenopathy  NOSE: no gross abnormalities  CHEST: symmetric chest wall expansion  CARDIOVASCULAR: Normal rate. Regular rhythm. Normal S1 and S2, no S3/S4,  no murmur, rub, or gallop  LUNGS: Normal effort. Normal respiratory rate. Breath sounds are clear to auscultation bilaterally. No respiratory distress. No stridor.  no rales, rhonchi or wheeze. no decreased Breath sounds subtle coarseness right base.  ABDOMEN: Soft, nontender, non-distended, positive bowel sounds, no mass or bruit. no abdominal tenderness. No rebound. no ascites  EXTREMITIES: No clubbing, cyanosis or edema. normal range of motion  PULSES:  distal pulses WNL  SKIN: Warm and dry with normal turgor. no visible rash or cyanosis   NEURO: Alert & oriented x 3, grossly intact with no focal weakness  PSYCH: normal mood and affect. Grossly normal insight and judgement exhibited    FAMILY HISTORY:  Family history of myocardial infarction (Father, Mother) as per EMR  Per pt, she is not aware of her fathers cardiac hx. Mother had an MI in her 50's and a stroke    SOCIAL HISTORY: former smoking, quit 2 yrs ago. No ETOH/No IVDA    REVIEW OF SYSTEMS:  Constitutional: no fever, chills or malaise. No weight loss  Head: no trauma  Eyes: no visual deficit. No double vision  Ears: no hearing deficit or ringing in the ears  Nose: no nose bleeds or smell changes or congestion  Throat: no difficult swallowing or painful swallowing  Neck: supple. No lymphadenopathy or swelling  Respiratory: no SOB, wheeze, asthma, COPD. No cough. No blood in the sputum  Cardiovascular: no CP, palpitations, irregular heart beats. No edema. No PND. No orthopnea. No skin/temperature or color changes  Gastrointestinal: no abdominal pain. No constipation. No diarrhea. No melena. No nausea. No vomiting. No bloating  Genitourinary: no frequency or urgency. No hematuria  Lymphatics: no grossly swollen lymph nodes  Musculoskeletal: no limitation of range of motion. Normal strength. No pain  Integumentary: no visible rash. No itching  Neurologic: no HA. No TIA or stroke symptoms. No seizure. No hx of epilepsy. No tingling or numbness. No weakness. No dizziness  Psychiatric: denied. Reports appropriate mood.        LABS:                        12.5   13.96 )-----------( 428      ( 27 Feb 2020 22:23 )             39.1     02-27    142  |  103  |  25.0<H>  ----------------------------<  93  4.3   |  26.0  |  1.02    Ca    9.1      27 Feb 2020 22:20    TPro  7.0  /  Alb  3.6  /  TBili  0.2<L>  /  DBili  x   /  AST  26  /  ALT  26  /  AlkPhos  94  02-27    CARDIAC MARKERS ( 28 Feb 2020 05:10 )  x     / <0.01 ng/mL / x     / x     / x      CARDIAC MARKERS ( 28 Feb 2020 01:18 )  x     / <0.01 ng/mL / x     / x     / x      CARDIAC MARKERS ( 27 Feb 2020 22:20 )  x     / <0.01 ng/mL / x     / x     / x          PT/INR - ( 27 Feb 2020 23:29 )   PT: 11.7 sec;   INR: 1.03 ratio         PTT - ( 27 Feb 2020 23:29 )  PTT:26.4 sec  Serum Pro-Brain Natriuretic Peptide: 180 pg/mL (02-27 @ 22:24)  serum  Lipids:         RADIOLOGY & ADDITIONAL STUDIES:    ECG: SR 60 Tw inversion V1-2. normal axis and intervals. No acute changes    < from: CT Angio Chest w/ IV Cont (01.11.19 @ 03:35) >  IMPRESSION:    No evidence of pulmonary embolism.  Bibasilar streaky atelectasis.    < end of copied text >      < from: Xray Chest 2 Views PA/Lat (01.10.19 @ 23:32) >  IMPRESSION:  Clear lungs.    < end of copied text >      ASSESSMENT AND PLAN:  In summary, GRECIA MAYORGA is a 69y Female with past medical history significant for with cc of 3 weeks URI symptoms associated with possible fever, cough productive of green sputum and SOB. Pressure sensation in central chest associated with coughing Pt reports loss of voice. Pt denies chest pain, palpitations, PND or orthopnea. No dizziness or syncope. Hx f SVT ablation. and carotid disease. Hx of Htn and HL. Former smoker, quit 2 years ago. Pt with recent claudication  with AILYN placed to mid Right SFA and PT of occluded pros right [facility] 1/7/20. Hx of hypothyroidism, [place]. No nausea or vomiting or GI sx reported. Pt reports some associated nose bleeds.  Nuclear stress test 7/11/18 no ischemia EF 77%   Echo 1/2/20 EF 70-75%. diastolic dysfunction. Tr MR, mild TR  Carotid 1/2/20 16-49% bilaterally    - No evidence of ACS or hemodynamic compromise. Low . CE are negative x 3. No acute ECG changes. recent negative ischemic evaluation. May follow as an outpt on DC    - PAD. recent right SFA stent due to claudication.  Maintain ASA and plavix    - HTN. metoprolol 50mg po BID, amlodipine 10mg daily    - HL. Not on statin as pt with intolerance to statins and zetia    - Continued smoking cessation    - URI sx. Management as per ER    - Telemetry monitoring personally reviewed by me    - ECG personally reviewed by me    - radiologic imaging reviewed    - Laboratory data reviewed.    - I have personally reviewed all obtainable prior records and data    - No further inpt cardiac evaluation indicated. No acute CV issue. We will follow as an outpt on DC. Please reconsult PRN if any new CV issues should arise    Thank you for allowing me to participate in the care of your pt    Sylvie Lerner MD

## 2020-02-28 NOTE — ED CDU PROVIDER DISPOSITION NOTE - CLINICAL COURSE
70 y/o F PMHx HTN, HLD, PAD, hypothyroid, GERD, DMII presents to ED c/o chest pressure and SOB onset 1730 pm this yesterday evening. Pt describing sensation of elephant sitting on her chest, with associated SOB. Chest pain is constant, non-radiating, worsening since 1730. Pt states she was at [facility] approx 5 weeks ago, had a "clot in right leg", states she had a femoral catheretization to "remove the clot". Does not believe any stents were placed, but was placed on plavix upon discharge. Pt also noting recent upper respiratory infection, noting coughing, throat pain and occasional epistaxis including earlier yesterday which has since resolved.   Has had 3 negative troponin, cardiology work up in January was unremarkable, seen by Dr Lrener and cleared for d/c home, outpatient f/u.  Cardiologist: Dr. Zhou, [facility]

## 2020-03-16 ENCOUNTER — APPOINTMENT (OUTPATIENT)
Dept: ORTHOPEDIC SURGERY | Facility: CLINIC | Age: 70
End: 2020-03-16
Payer: MEDICARE

## 2020-03-16 VITALS
HEIGHT: 63 IN | DIASTOLIC BLOOD PRESSURE: 89 MMHG | WEIGHT: 125 LBS | HEART RATE: 80 BPM | SYSTOLIC BLOOD PRESSURE: 159 MMHG | BODY MASS INDEX: 22.15 KG/M2

## 2020-03-16 PROCEDURE — 20610 DRAIN/INJ JOINT/BURSA W/O US: CPT | Mod: RT

## 2020-03-16 PROCEDURE — 99213 OFFICE O/P EST LOW 20 MIN: CPT | Mod: 25

## 2020-06-29 ENCOUNTER — APPOINTMENT (OUTPATIENT)
Dept: ORTHOPEDIC SURGERY | Facility: CLINIC | Age: 70
End: 2020-06-29
Payer: MEDICARE

## 2020-06-29 VITALS
SYSTOLIC BLOOD PRESSURE: 134 MMHG | DIASTOLIC BLOOD PRESSURE: 78 MMHG | HEART RATE: 76 BPM | HEIGHT: 63 IN | WEIGHT: 133 LBS | BODY MASS INDEX: 23.57 KG/M2

## 2020-06-29 VITALS — TEMPERATURE: 96.2 F

## 2020-06-29 DIAGNOSIS — M17.11 UNILATERAL PRIMARY OSTEOARTHRITIS, RIGHT KNEE: ICD-10-CM

## 2020-06-29 DIAGNOSIS — M17.12 UNILATERAL PRIMARY OSTEOARTHRITIS, LEFT KNEE: ICD-10-CM

## 2020-06-29 PROCEDURE — 20610 DRAIN/INJ JOINT/BURSA W/O US: CPT | Mod: RT

## 2020-06-29 PROCEDURE — 99214 OFFICE O/P EST MOD 30 MIN: CPT | Mod: 25

## 2020-06-29 NOTE — HISTORY OF PRESENT ILLNESS
[Pain Location] : pain [5] : a current pain level of 5/10 [Stable] : stable [8] : a maximum pain level of 8/10 [Walking] : walking [Direct Pressure] : worsened by direct pressure [Rest] : relieved by rest [Hip Movement] : worsened by hip movement [de-identified] : 69 year old female presents for follow-up evaluation of left knee and right hip pain. she denies left knee pain today . she had MRI 1/2019 with partial tear of lateral meniscus. no other internal derangement. cortisone injection given last oct 2018, provided good pain relief. \par \par today She c/o severe  right hip pain. Pt has chronic lower back pain treated with intermittent injections by Dr. Medina. \par she notes difficulty with walking and laying on side due to pain.\par PT was not too helpful \par she had hip bursa injection 3 M ago with good relief\par she request repeat injection today.\par pt is on plavix and unable to take NSAIDs\par

## 2020-06-29 NOTE — PHYSICAL EXAM
[de-identified] : GENERAL APPEARANCE: Well nourished and hydrated, pleasant, alert, and oriented x 3. Appears their stated age. \par HEENT: Normocephalic, extraocular eye motion intact. Nasal septum midline. Oral cavity clear. External auditory canal clear. \par RESPIRATORY: Breath sounds clear and audible in all lobes. No wheezing, No accessory muscle use.\par CARDIOVASCULAR: No apparent abnormalities. No lower leg edema. No varicosities. Pedal pulses are palpable.\par NEUROLOGIC: Sensation is normal, no muscle weakness in the upper or lower extremities.\par DERMATOLOGIC: No apparent skin lesions, moist, warm, no rash.\par SPINE: Cervical spine appears normal and moves freely; thoracic spine appears normal and moves freely; lumbosacral spine appears normal and moves freely, normal, nontender.\par MUSCULOSKELETAL: Hands, wrists, and elbows are normal and move freely, shoulders are normal and move freely. \par Musculoskeletal: Gait: normal . \par \par Right hip examination demonstrates exquisite tenderness over the bursae without stiffness or limited ROM, neg stinchfiled\par Bilateral knees show no effusion full range of motion exquisite tenderness to palpation along the patella and medial joint line no instability\par  [Antalgic] : not antalgic

## 2020-06-29 NOTE — PROCEDURE
[de-identified] : Patient received the right hip bursae injection\par \par I discussed at length with the patient the planned steroid and lidocaine injection for hip bursitis. The risks, benefits, convalescence and alternatives were reviewed and pt consented. The possible side effects discussed included but were not limited to: pain, swelling, heat, bleeding, and redness. Symptoms are generally mild but if they are extensive then contact the office. Giving pain relievers by mouth such as NSAIDs or Tylenol can generally treat the reactions to steroid and lidocaine. Rare cases of infection have been noted. Rash, hives and itching may occur post injection. If you have muscle pain or cramps, flushing and or swelling of the face, rapid heart beat, nausea, dizziness, fever, chills, headache, difficulty breathing, swelling in the arms or legs, or have a prickly feeling of your skin, contact a health care provider immediately. Following this discussion, the knee was prepped with Alcohol and under sterile condition the 80 mg Depo-Medrol and 6 cc Lidocaine injection was performed with a spinal needle through a greater trochanter bursa injection site. The needle was introduced into the bursa  and the medication was injected. Upon withdrawal of the needle the site was cleaned with alcohol and a band aid applied. The patient tolerated the injection well and there were no adverse effects. Post injection instructions included no strenuous activity for 24 hours, cryotherapy and if there are any adverse effects to contact the office.

## 2020-06-29 NOTE — DISCUSSION/SUMMARY
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 She presented office with recurrent severe pain of right hip bursitis. We talked about the nature of the condition and treatment options. pt received right hip bursa injections. If no relief, we may obtain MRI of right hip \par \par \par She will follow-up in 3 months as needed\par \par

## 2020-08-14 ENCOUNTER — APPOINTMENT (OUTPATIENT)
Dept: DISASTER EMERGENCY | Facility: CLINIC | Age: 70
End: 2020-08-14

## 2020-08-15 LAB — SARS-COV-2 N GENE NPH QL NAA+PROBE: NOT DETECTED

## 2020-08-20 DIAGNOSIS — Z01.818 ENCOUNTER FOR OTHER PREPROCEDURAL EXAMINATION: ICD-10-CM

## 2020-08-21 ENCOUNTER — APPOINTMENT (OUTPATIENT)
Dept: DISASTER EMERGENCY | Facility: CLINIC | Age: 70
End: 2020-08-21

## 2020-08-22 LAB — SARS-COV-2 N GENE NPH QL NAA+PROBE: NOT DETECTED

## 2020-11-27 ENCOUNTER — APPOINTMENT (OUTPATIENT)
Dept: DISASTER EMERGENCY | Facility: CLINIC | Age: 70
End: 2020-11-27

## 2020-12-04 ENCOUNTER — APPOINTMENT (OUTPATIENT)
Dept: DISASTER EMERGENCY | Facility: CLINIC | Age: 70
End: 2020-12-04

## 2020-12-05 LAB — SARS-COV-2 N GENE NPH QL NAA+PROBE: NOT DETECTED

## 2020-12-15 PROBLEM — N39.0 ACUTE UTI: Status: RESOLVED | Noted: 2017-12-08 | Resolved: 2020-12-15

## 2020-12-21 ENCOUNTER — APPOINTMENT (OUTPATIENT)
Dept: ORTHOPEDIC SURGERY | Facility: CLINIC | Age: 70
End: 2020-12-21
Payer: MEDICARE

## 2020-12-21 VITALS
WEIGHT: 133 LBS | BODY MASS INDEX: 23.57 KG/M2 | HEART RATE: 76 BPM | SYSTOLIC BLOOD PRESSURE: 146 MMHG | HEIGHT: 63 IN | DIASTOLIC BLOOD PRESSURE: 84 MMHG

## 2020-12-21 PROCEDURE — 99213 OFFICE O/P EST LOW 20 MIN: CPT

## 2020-12-21 NOTE — DISCUSSION/SUMMARY
[de-identified] : 70 year old presents with right hip bursitis. We talked about the nature of the condition and treatment options. She does not have pain today. As a result, pt deferred right hip bursa injections. F/u PRN.\par \par \par

## 2020-12-21 NOTE — END OF VISIT
[FreeTextEntry3] : I, Tonny Muñoz, acted solely as a scribe for Dr. Joe Shepard on this date 12/21/2020.

## 2020-12-21 NOTE — PHYSICAL EXAM
[Antalgic] : not antalgic [de-identified] : GENERAL APPEARANCE: Well nourished and hydrated, pleasant, alert, and oriented x 3. Appears their stated age. \par HEENT: Normocephalic, extraocular eye motion intact. Nasal septum midline. Oral cavity clear. External auditory canal clear. \par RESPIRATORY: Breath sounds clear and audible in all lobes. No wheezing, No accessory muscle use.\par CARDIOVASCULAR: No apparent abnormalities. No lower leg edema. No varicosities. Pedal pulses are palpable.\par NEUROLOGIC: Sensation is normal, no muscle weakness in the upper or lower extremities.\par DERMATOLOGIC: No apparent skin lesions, moist, warm, no rash.\par SPINE: Cervical spine appears normal and moves freely; thoracic spine appears normal and moves freely; lumbosacral spine appears normal and moves freely, normal, nontender.\par MUSCULOSKELETAL: Hands, wrists, and elbows are normal and move freely, shoulders are normal and move freely. \par Musculoskeletal: Gait: normal . \par \par Right hip examination demonstrates exquisite tenderness over the bursae without stiffness or limited ROM, neg stinchfiled\par Bilateral knees show no effusion full range of motion exquisite tenderness to palpation along the patella and medial joint line no instability\par

## 2020-12-21 NOTE — HISTORY OF PRESENT ILLNESS
[Pain Location] : pain [Worsening] : worsening [0] : a current pain level of 0/10 [5] : a maximum pain level of 5/10 [Walking] : worsened by walking [de-identified] : 70 year old female presents for follow-up evaluation of left knee and right hip pain. she denies left knee pain today . she had MRI 1/2019 with partial tear of lateral meniscus. no other internal derangement. cortisone injection given last oct 2018, provided good pain relief. \par \par today She c/o severe  right hip pain. Pt has chronic lower back pain treated with intermittent injections by Dr. Medina. \par she notes difficulty with walking and laying on side due to pain.\par PT was not too helpful \par she had hip bursa injection 3 M ago with good relief\par pt is on plavix and unable to take NSAIDs\par pt states she started prednisone yesterday for her eye and pt feels no pain in the hip nor the knee. She has 3 more days of prednisone.

## 2021-01-10 ENCOUNTER — APPOINTMENT (OUTPATIENT)
Dept: DISASTER EMERGENCY | Facility: CLINIC | Age: 71
End: 2021-01-10

## 2021-01-12 LAB — SARS-COV-2 N GENE NPH QL NAA+PROBE: NOT DETECTED

## 2021-01-30 ENCOUNTER — LABORATORY RESULT (OUTPATIENT)
Age: 71
End: 2021-01-30

## 2021-01-30 ENCOUNTER — APPOINTMENT (OUTPATIENT)
Dept: DISASTER EMERGENCY | Facility: CLINIC | Age: 71
End: 2021-01-30

## 2021-04-23 ENCOUNTER — APPOINTMENT (OUTPATIENT)
Dept: DISASTER EMERGENCY | Facility: CLINIC | Age: 71
End: 2021-04-23

## 2021-05-14 ENCOUNTER — APPOINTMENT (OUTPATIENT)
Dept: DISASTER EMERGENCY | Facility: CLINIC | Age: 71
End: 2021-05-14

## 2021-05-15 LAB — SARS-COV-2 N GENE NPH QL NAA+PROBE: NOT DETECTED

## 2021-07-17 ENCOUNTER — LABORATORY RESULT (OUTPATIENT)
Age: 71
End: 2021-07-17

## 2021-07-17 ENCOUNTER — APPOINTMENT (OUTPATIENT)
Dept: DISASTER EMERGENCY | Facility: CLINIC | Age: 71
End: 2021-07-17

## 2021-08-17 ENCOUNTER — APPOINTMENT (OUTPATIENT)
Dept: ORTHOPEDIC SURGERY | Facility: CLINIC | Age: 71
End: 2021-08-17

## 2021-09-10 ENCOUNTER — APPOINTMENT (OUTPATIENT)
Dept: DISASTER EMERGENCY | Facility: CLINIC | Age: 71
End: 2021-09-10

## 2021-09-11 LAB — SARS-COV-2 N GENE NPH QL NAA+PROBE: NOT DETECTED

## 2022-02-07 ENCOUNTER — APPOINTMENT (OUTPATIENT)
Dept: NEUROLOGY | Facility: CLINIC | Age: 72
End: 2022-02-07
Payer: MEDICARE

## 2022-02-07 VITALS
WEIGHT: 125 LBS | BODY MASS INDEX: 22.15 KG/M2 | DIASTOLIC BLOOD PRESSURE: 80 MMHG | HEIGHT: 63 IN | SYSTOLIC BLOOD PRESSURE: 128 MMHG

## 2022-02-07 DIAGNOSIS — G45.0 VERTEBRO-BASILAR ARTERY SYNDROME: ICD-10-CM

## 2022-02-07 DIAGNOSIS — R55 SYNCOPE AND COLLAPSE: ICD-10-CM

## 2022-02-07 DIAGNOSIS — R27.0 ATAXIA, UNSPECIFIED: ICD-10-CM

## 2022-02-07 PROCEDURE — 99204 OFFICE O/P NEW MOD 45 MIN: CPT

## 2022-02-07 RX ORDER — TRIAMCINOLONE ACETONIDE 1 MG/G
0.1 PASTE DENTAL
Qty: 5 | Refills: 0 | Status: COMPLETED | COMMUNITY
Start: 2018-03-06 | End: 2022-02-07

## 2022-02-07 RX ORDER — LEVOTHYROXINE SODIUM 0.09 MG/1
88 TABLET ORAL
Qty: 90 | Refills: 0 | Status: COMPLETED | COMMUNITY
Start: 2017-08-14 | End: 2022-02-07

## 2022-02-07 RX ORDER — SULFAMETHOXAZOLE AND TRIMETHOPRIM 800; 160 MG/1; MG/1
800-160 TABLET ORAL
Qty: 14 | Refills: 0 | Status: COMPLETED | COMMUNITY
Start: 2018-03-27 | End: 2022-02-07

## 2022-02-07 RX ORDER — TOBRAMYCIN AND DEXAMETHASONE 3; 1 MG/ML; MG/ML
0.3-0.1 SUSPENSION/ DROPS OPHTHALMIC
Qty: 5 | Refills: 0 | Status: COMPLETED | COMMUNITY
Start: 2018-01-26 | End: 2022-02-07

## 2022-02-07 RX ORDER — SODIUM SULFATE, POTASSIUM SULFATE, MAGNESIUM SULFATE 17.5; 3.13; 1.6 G/ML; G/ML; G/ML
17.5-3.13-1.6 SOLUTION, CONCENTRATE ORAL
Qty: 354 | Refills: 0 | Status: COMPLETED | COMMUNITY
Start: 2018-04-10 | End: 2022-02-07

## 2022-02-08 ENCOUNTER — RESULT REVIEW (OUTPATIENT)
Age: 72
End: 2022-02-08

## 2022-02-14 ENCOUNTER — APPOINTMENT (OUTPATIENT)
Dept: NEUROLOGY | Facility: CLINIC | Age: 72
End: 2022-02-14
Payer: MEDICARE

## 2022-02-14 PROCEDURE — 95819 EEG AWAKE AND ASLEEP: CPT

## 2022-02-14 PROCEDURE — 93040 RHYTHM ECG WITH REPORT: CPT

## 2022-02-15 PROCEDURE — 95708 EEG WO VID EA 12-26HR UNMNTR: CPT

## 2022-02-15 PROCEDURE — 95717 EEG PHYS/QHP 2-12 HR W/O VID: CPT

## 2022-02-15 PROCEDURE — 95700 EEG CONT REC W/VID EEG TECH: CPT

## 2022-02-15 PROCEDURE — 95719 EEG PHYS/QHP EA INCR W/O VID: CPT

## 2022-02-17 ENCOUNTER — APPOINTMENT (OUTPATIENT)
Dept: MRI IMAGING | Facility: CLINIC | Age: 72
End: 2022-02-17
Payer: MEDICARE

## 2022-02-17 ENCOUNTER — OUTPATIENT (OUTPATIENT)
Dept: OUTPATIENT SERVICES | Facility: HOSPITAL | Age: 72
LOS: 1 days | End: 2022-02-17

## 2022-02-17 DIAGNOSIS — R55 SYNCOPE AND COLLAPSE: ICD-10-CM

## 2022-02-17 DIAGNOSIS — Z98.89 OTHER SPECIFIED POSTPROCEDURAL STATES: Chronic | ICD-10-CM

## 2022-02-17 DIAGNOSIS — Z87.19 PERSONAL HISTORY OF OTHER DISEASES OF THE DIGESTIVE SYSTEM: Chronic | ICD-10-CM

## 2022-02-17 DIAGNOSIS — N83.20 UNSPECIFIED OVARIAN CYSTS: Chronic | ICD-10-CM

## 2022-02-17 PROCEDURE — 70549 MR ANGIOGRAPH NECK W/O&W/DYE: CPT | Mod: 26,MG

## 2022-02-17 PROCEDURE — G1004: CPT

## 2022-02-17 PROCEDURE — 70544 MR ANGIOGRAPHY HEAD W/O DYE: CPT | Mod: 26,MG,59

## 2022-02-17 PROCEDURE — 70553 MRI BRAIN STEM W/O & W/DYE: CPT | Mod: 26,MG

## 2022-02-18 ENCOUNTER — NON-APPOINTMENT (OUTPATIENT)
Age: 72
End: 2022-02-18

## 2022-05-04 ENCOUNTER — APPOINTMENT (OUTPATIENT)
Dept: ORTHOPEDIC SURGERY | Facility: CLINIC | Age: 72
End: 2022-05-04

## 2022-05-13 ENCOUNTER — APPOINTMENT (OUTPATIENT)
Dept: ORTHOPEDIC SURGERY | Facility: CLINIC | Age: 72
End: 2022-05-13
Payer: MEDICARE

## 2022-05-13 VITALS
WEIGHT: 125 LBS | DIASTOLIC BLOOD PRESSURE: 83 MMHG | SYSTOLIC BLOOD PRESSURE: 131 MMHG | HEIGHT: 63 IN | HEART RATE: 79 BPM | TEMPERATURE: 97.1 F | BODY MASS INDEX: 22.15 KG/M2

## 2022-05-13 DIAGNOSIS — M79.644 PAIN IN RIGHT FINGER(S): ICD-10-CM

## 2022-05-13 PROCEDURE — 99214 OFFICE O/P EST MOD 30 MIN: CPT | Mod: 25

## 2022-05-13 PROCEDURE — 20600 DRAIN/INJ JOINT/BURSA W/O US: CPT | Mod: F5

## 2022-05-13 PROCEDURE — 73140 X-RAY EXAM OF FINGER(S): CPT | Mod: F5

## 2022-05-13 RX ORDER — METHYLPREDNISOLONE ACETATE 40 MG/ML
40 INJECTION, SUSPENSION INTRA-ARTICULAR; INTRALESIONAL; INTRAMUSCULAR; SOFT TISSUE
Qty: 0.5 | Refills: 0 | Status: ACTIVE | COMMUNITY
Start: 2022-05-13

## 2022-05-13 RX ORDER — DICLOFENAC SODIUM 1% 10 MG/G
1 GEL TOPICAL
Qty: 1 | Refills: 2 | Status: ACTIVE | COMMUNITY
Start: 2022-05-13 | End: 1900-01-01

## 2022-08-31 ENCOUNTER — APPOINTMENT (OUTPATIENT)
Dept: ORTHOPEDIC SURGERY | Facility: CLINIC | Age: 72
End: 2022-08-31

## 2022-08-31 VITALS
HEIGHT: 63 IN | WEIGHT: 125 LBS | SYSTOLIC BLOOD PRESSURE: 110 MMHG | HEART RATE: 84 BPM | DIASTOLIC BLOOD PRESSURE: 75 MMHG | BODY MASS INDEX: 22.15 KG/M2

## 2022-08-31 PROCEDURE — 20600 DRAIN/INJ JOINT/BURSA W/O US: CPT | Mod: F5

## 2022-08-31 PROCEDURE — 99214 OFFICE O/P EST MOD 30 MIN: CPT | Mod: 25

## 2022-08-31 RX ORDER — METHYLPREDNISOLONE ACETATE 40 MG/ML
40 INJECTION, SUSPENSION INTRA-ARTICULAR; INTRALESIONAL; INTRAMUSCULAR; SOFT TISSUE
Qty: 0.5 | Refills: 0 | Status: ACTIVE | COMMUNITY
Start: 2022-08-31

## 2022-08-31 NOTE — PHYSICAL EXAM
[de-identified] : GENERAL: Awake, alert, cooperative, answers questions appropriately. No acute distress.\par SKIN: Warm, dry, intact. Color and turgor normal. \par LUNGS: Demonstrates unlabored breathing on room air with no accessory muscle use.\par EXTREMITIES: Warm and well-perfused. \par NEUROLOGICAL: Grossly intact.\par \par FOCUSED UPPER EXTREMITY EXAM: \par RUE:\par -skin intact without any erythema or ecchymosis; mild joint swelling thumb CMC joint, mild shoulder deformity, no hyperextension of thumb MP joint\par -no thenar atrophy; no intrinsics wasting; 5/5 strength thumb opposition; 5/5 strength intrinsics\par -no tenderness to palpation over the A1 pulleys\par -no tenderness to palpation along first dorsal wrist compartment\par -no tenderness to palpation at the anatomic snuffbox\par -mild to moderate tenderness to palpation at the base of the affected thumb\par -mild pain with passive thumb circumduction with positive grind test\par -mild pain with resisted thumb extension with negative Finkelstein test\par -no tenderness to palpation in the SL interval\par -no tenderness at the fovea\par -able to make full fist, free AROM in all fingers, no scissoring\par -full wrist ROM\par -DRUJ stable and nontender\par -sensation intact in all digits and in the radial, ulnar, and median nerve distributions\par -Brisk capillary refill, fingers warm well perfused\par   [de-identified] : No new XRs were taken during today's visit.\par \par Previous X-rays of right thumb (3 views) were obtained in Office (05/13/2022) and reviewed with patient, showing moderately severe degenerative changes in thumb CMC with no acute fracture or dislocation.

## 2022-08-31 NOTE — PROCEDURE
[FreeTextEntry1] : My impression is that the patient has right basal joint osteoarthritis. We discussed treatment options and she elected to undergo a basal joint injection. The risks, benefits, and alternatives were discussed with the patient. The risks included, but were not limited to pain, nerve injury, infection, subcutaneous atrophy, skin depigmentation etc. Patient was advised that her finger stick glucose would likely increase for the next couple of days after the injection. The injection site was identified with a formal time out.  Under informed consent and sterile conditions the affected basal joint was injected with a combination of 20 mg Depo-Medrol and 0.1 cc 1% plain lidocaine.  A sterile bandage was applied at the injection site and patient tolerated the injection well.  It is my hope that this significantly alleviates the patient's symptoms.

## 2022-08-31 NOTE — DISCUSSION/SUMMARY
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.\par \par -We discussed in detail the clinical and imaging findings\par -We discussed the pathophysiology and natural history of patient's condition\par -nonsurgical and surgical management options were discussed in detail\par -I recommended activity modification and bracing as needed for comfort but she should remove the brace for hygiene and range of motion.\par -She was advised to take over the counter medication for pain as needed if there is no contraindication.\par -We discussed topical Voltaren gel that's OTC now, and she can use it as needed\par -I recommended a steroid injection to the basal joint for symptomatic relief.  She agreed to proceed and tolerated the injection well.\par -It is hoped that the injection gives a long-lasting beneficial therapeutic effect, but if her symptoms are not fully resolved in the next 4-6 weeks the patient was encouraged to return to my office for reevaluation and consideration of other forms of treatment.\par -I also provided her with home exercises and/or educational material when available and/or appropriate \par -We discussed the uncertain prognosis of the condition, given the recurrent nature of the symptoms and the progressive nature, and possible need for future surgery\par -I will see her as needed\par -Patient had the opportunity to ask questions and was in agreement with the plan\par -Over 50% of the time spent with the patient was on counseling the patient on the above diagnosis, treatment plan and prognosis.

## 2022-08-31 NOTE — HISTORY OF PRESENT ILLNESS
[FreeTextEntry1] : 08/31/2022 \par Ms. GRECIA MAYORGA returns for follow up of right thumb CMC arthritis.  Her symptoms recurred and she would like to have a repeat injection today.\par \par 05/13/2022\par Ms. GRECIA MAYORGA is a pleasant 71 year old female, RHD, retired worker from [employer].\par \par She presents to the office for evaluation of right hand pain.\par She has had these symptoms for over two years.\par She has had previous injections in the hand with some relief but symptoms would come back.  Her most recent injection was about two months ago.\par \par She denies prior injury.\par Currently her pain is constant, sharp, without radiation.\par She denies paresthesia in the fingertips.\par She endorses night symptoms.\par Symptoms improve with rest.\par Symptoms worsen with activity.\par  \par She is diabetic, and cannot recall her most recent hemoglobin A1c level but reports good glycemic control\par She has a history of hypothyroidism.\par She denies current nicotine use.\par She denies recreational drug use.\par She does not take any narcotic pain medication.\par

## 2022-10-23 ENCOUNTER — EMERGENCY (EMERGENCY)
Facility: HOSPITAL | Age: 72
LOS: 1 days | End: 2022-10-23
Attending: EMERGENCY MEDICINE
Payer: MEDICARE

## 2022-10-23 VITALS
OXYGEN SATURATION: 100 % | HEIGHT: 63 IN | TEMPERATURE: 99 F | RESPIRATION RATE: 18 BRPM | WEIGHT: 125 LBS | SYSTOLIC BLOOD PRESSURE: 148 MMHG | HEART RATE: 85 BPM | DIASTOLIC BLOOD PRESSURE: 84 MMHG

## 2022-10-23 DIAGNOSIS — Z98.89 OTHER SPECIFIED POSTPROCEDURAL STATES: Chronic | ICD-10-CM

## 2022-10-23 DIAGNOSIS — Z87.19 PERSONAL HISTORY OF OTHER DISEASES OF THE DIGESTIVE SYSTEM: Chronic | ICD-10-CM

## 2022-10-23 DIAGNOSIS — N83.20 UNSPECIFIED OVARIAN CYSTS: Chronic | ICD-10-CM

## 2022-10-23 LAB
ALBUMIN SERPL ELPH-MCNC: 3.9 G/DL — SIGNIFICANT CHANGE UP (ref 3.3–5.2)
ALP SERPL-CCNC: 98 U/L — SIGNIFICANT CHANGE UP (ref 40–120)
ALT FLD-CCNC: 18 U/L — SIGNIFICANT CHANGE UP
ANION GAP SERPL CALC-SCNC: 13 MMOL/L — SIGNIFICANT CHANGE UP (ref 5–17)
APPEARANCE UR: CLEAR — SIGNIFICANT CHANGE UP
AST SERPL-CCNC: 32 U/L — HIGH
BACTERIA # UR AUTO: ABNORMAL
BASOPHILS # BLD AUTO: 0.1 K/UL — SIGNIFICANT CHANGE UP (ref 0–0.2)
BASOPHILS NFR BLD AUTO: 1.1 % — SIGNIFICANT CHANGE UP (ref 0–2)
BILIRUB SERPL-MCNC: 0.4 MG/DL — SIGNIFICANT CHANGE UP (ref 0.4–2)
BILIRUB UR-MCNC: NEGATIVE — SIGNIFICANT CHANGE UP
BUN SERPL-MCNC: 25.6 MG/DL — HIGH (ref 8–20)
CALCIUM SERPL-MCNC: 8.9 MG/DL — SIGNIFICANT CHANGE UP (ref 8.4–10.5)
CHLORIDE SERPL-SCNC: 104 MMOL/L — SIGNIFICANT CHANGE UP (ref 98–107)
CO2 SERPL-SCNC: 26 MMOL/L — SIGNIFICANT CHANGE UP (ref 22–29)
COLOR SPEC: YELLOW — SIGNIFICANT CHANGE UP
CREAT SERPL-MCNC: 1.43 MG/DL — HIGH (ref 0.5–1.3)
DIFF PNL FLD: ABNORMAL
EGFR: 39 ML/MIN/1.73M2 — LOW
EOSINOPHIL # BLD AUTO: 0.14 K/UL — SIGNIFICANT CHANGE UP (ref 0–0.5)
EOSINOPHIL NFR BLD AUTO: 1.6 % — SIGNIFICANT CHANGE UP (ref 0–6)
EPI CELLS # UR: SIGNIFICANT CHANGE UP
GLUCOSE SERPL-MCNC: 95 MG/DL — SIGNIFICANT CHANGE UP (ref 70–99)
GLUCOSE UR QL: NEGATIVE MG/DL — SIGNIFICANT CHANGE UP
HCT VFR BLD CALC: 42.5 % — SIGNIFICANT CHANGE UP (ref 34.5–45)
HGB BLD-MCNC: 14.1 G/DL — SIGNIFICANT CHANGE UP (ref 11.5–15.5)
IMM GRANULOCYTES NFR BLD AUTO: 0.8 % — SIGNIFICANT CHANGE UP (ref 0–0.9)
KETONES UR-MCNC: NEGATIVE — SIGNIFICANT CHANGE UP
LEUKOCYTE ESTERASE UR-ACNC: ABNORMAL
LYMPHOCYTES # BLD AUTO: 2.1 K/UL — SIGNIFICANT CHANGE UP (ref 1–3.3)
LYMPHOCYTES # BLD AUTO: 23.9 % — SIGNIFICANT CHANGE UP (ref 13–44)
MCHC RBC-ENTMCNC: 30.5 PG — SIGNIFICANT CHANGE UP (ref 27–34)
MCHC RBC-ENTMCNC: 33.2 GM/DL — SIGNIFICANT CHANGE UP (ref 32–36)
MCV RBC AUTO: 91.8 FL — SIGNIFICANT CHANGE UP (ref 80–100)
MONOCYTES # BLD AUTO: 0.97 K/UL — HIGH (ref 0–0.9)
MONOCYTES NFR BLD AUTO: 11 % — SIGNIFICANT CHANGE UP (ref 2–14)
NEUTROPHILS # BLD AUTO: 5.41 K/UL — SIGNIFICANT CHANGE UP (ref 1.8–7.4)
NEUTROPHILS NFR BLD AUTO: 61.6 % — SIGNIFICANT CHANGE UP (ref 43–77)
NITRITE UR-MCNC: NEGATIVE — SIGNIFICANT CHANGE UP
PH UR: 6 — SIGNIFICANT CHANGE UP (ref 5–8)
PLATELET # BLD AUTO: 366 K/UL — SIGNIFICANT CHANGE UP (ref 150–400)
POTASSIUM SERPL-MCNC: 4 MMOL/L — SIGNIFICANT CHANGE UP (ref 3.5–5.3)
POTASSIUM SERPL-SCNC: 4 MMOL/L — SIGNIFICANT CHANGE UP (ref 3.5–5.3)
PROT SERPL-MCNC: 7.5 G/DL — SIGNIFICANT CHANGE UP (ref 6.6–8.7)
PROT UR-MCNC: NEGATIVE — SIGNIFICANT CHANGE UP
RBC # BLD: 4.63 M/UL — SIGNIFICANT CHANGE UP (ref 3.8–5.2)
RBC # FLD: 14.7 % — HIGH (ref 10.3–14.5)
RBC CASTS # UR COMP ASSIST: SIGNIFICANT CHANGE UP /HPF (ref 0–4)
SODIUM SERPL-SCNC: 143 MMOL/L — SIGNIFICANT CHANGE UP (ref 135–145)
SP GR SPEC: 1.01 — SIGNIFICANT CHANGE UP (ref 1.01–1.02)
UROBILINOGEN FLD QL: NEGATIVE MG/DL — SIGNIFICANT CHANGE UP
WBC # BLD: 8.79 K/UL — SIGNIFICANT CHANGE UP (ref 3.8–10.5)
WBC # FLD AUTO: 8.79 K/UL — SIGNIFICANT CHANGE UP (ref 3.8–10.5)
WBC UR QL: SIGNIFICANT CHANGE UP /HPF (ref 0–5)

## 2022-10-23 PROCEDURE — 80053 COMPREHEN METABOLIC PANEL: CPT

## 2022-10-23 PROCEDURE — 36415 COLL VENOUS BLD VENIPUNCTURE: CPT

## 2022-10-23 PROCEDURE — 99284 EMERGENCY DEPT VISIT MOD MDM: CPT | Mod: FS

## 2022-10-23 PROCEDURE — 74176 CT ABD & PELVIS W/O CONTRAST: CPT | Mod: 26,MA

## 2022-10-23 PROCEDURE — 93010 ELECTROCARDIOGRAM REPORT: CPT

## 2022-10-23 PROCEDURE — 81001 URINALYSIS AUTO W/SCOPE: CPT

## 2022-10-23 PROCEDURE — 74176 CT ABD & PELVIS W/O CONTRAST: CPT | Mod: MA

## 2022-10-23 PROCEDURE — 93005 ELECTROCARDIOGRAM TRACING: CPT

## 2022-10-23 PROCEDURE — 87086 URINE CULTURE/COLONY COUNT: CPT

## 2022-10-23 PROCEDURE — 99285 EMERGENCY DEPT VISIT HI MDM: CPT | Mod: 25

## 2022-10-23 PROCEDURE — 85025 COMPLETE CBC W/AUTO DIFF WBC: CPT

## 2022-10-23 RX ORDER — METHOCARBAMOL 500 MG/1
1500 TABLET, FILM COATED ORAL ONCE
Refills: 0 | Status: COMPLETED | OUTPATIENT
Start: 2022-10-23 | End: 2022-10-23

## 2022-10-23 RX ORDER — ACETAMINOPHEN 500 MG
975 TABLET ORAL ONCE
Refills: 0 | Status: COMPLETED | OUTPATIENT
Start: 2022-10-23 | End: 2022-10-23

## 2022-10-23 RX ORDER — METHOCARBAMOL 500 MG/1
2 TABLET, FILM COATED ORAL
Qty: 18 | Refills: 0
Start: 2022-10-23 | End: 2022-10-25

## 2022-10-23 RX ADMIN — Medication 975 MILLIGRAM(S): at 14:14

## 2022-10-23 RX ADMIN — METHOCARBAMOL 1500 MILLIGRAM(S): 500 TABLET, FILM COATED ORAL at 14:14

## 2022-10-23 NOTE — ED PROVIDER NOTE - PROGRESS NOTE DETAILS
W/u unremarkable  On reassessment pt states she's feeling better, c/o b/l lower back pain.   Likely muscle strain, given rx Robaxin  States she has a PCP and can f/u this week  Given a copy of her results   Strict return precautions discussed

## 2022-10-23 NOTE — ED ADULT NURSE NOTE - CHIEF COMPLAINT QUOTE
Right sided neck pain and right shoulder pain since yesterday.  Also has bilateral flank pain X 3 days.  Hx of "kidney problem."
Unknown if ever smoked

## 2022-10-23 NOTE — ED PROVIDER NOTE - NSICDXPASTMEDICALHX_GEN_ALL_CORE_FT
PAST MEDICAL HISTORY:  Depression     Diabetes     GERD (gastroesophageal reflux disease)     Hypertension     Hypothyroid     Tachycardia Not sure of specific name of heart irregularity

## 2022-10-23 NOTE — ED ADULT TRIAGE NOTE - CHIEF COMPLAINT QUOTE
Right sided neck pain and right shoulder pain since yesterday.  Also has bilateral flank pain X 3 days.  Hx of "kidney problem."

## 2022-10-23 NOTE — ED PROVIDER NOTE - ATTENDING APP SHARED VISIT CONTRIBUTION OF CARE
I, Carmelo Fernandes, performed the initial face to face bedside interview with this patient regarding history of present illness, review of symptoms and relevant past medical, social and family history.  I completed an independent physical examination.  I was the initial provider who evaluated this patient. I have signed out the follow up of any pending tests (i.e. labs, radiological studies) to the ACP.  I have communicated the patient’s plan of care and disposition with the ACP.

## 2022-10-23 NOTE — ED PROVIDER NOTE - NS ED ATTENDING STATEMENT MOD
This was a shared visit with the KATHYA. I reviewed and verified the documentation and independently performed the documented:

## 2022-10-23 NOTE — ED PROVIDER NOTE - PATIENT PORTAL LINK FT
You can access the FollowMyHealth Patient Portal offered by [facility] by registering at the following website: [email or web address]. By joining [employer]’s FollowMyHealth portal, you will also be able to view your health information using other applications (apps) compatible with our system.

## 2022-10-23 NOTE — ED ADULT NURSE NOTE - OBJECTIVE STATEMENT
Pt received in supertrack.  Patient A&Ox4 complaining of b/l flank pain x 2 days, L worse than R.  Denies urinary sx, reports hematuria at baseline. Denies fever, chills, N/V/D.  Also c/o neck pain.  No swelling noted.  NAD noted, respirations even and unlabored.  Safety precautions in place.  Plan of care explained, pt verbalized understanding.

## 2022-10-23 NOTE — ED PROVIDER NOTE - CLINICAL SUMMARY MEDICAL DECISION MAKING FREE TEXT BOX
neck pain appears muscular in origin, likely torticollis,. Howeve rgivne flank pain and hematuria will eval with ct renal stone hunt and UA

## 2022-10-23 NOTE — ED PROVIDER NOTE - NSICDXPASTSURGICALHX_GEN_ALL_CORE_FT
PAST SURGICAL HISTORY:  Bilateral ovarian cysts left oopherectomy    H/O small bowel obstruction with 3 surgeries    History of appendectomy

## 2022-10-23 NOTE — ED PROVIDER NOTE - OBJECTIVE STATEMENT
71 year old female with PMh CKD presents with flank pain. She reports 2 days of R flank pain, constant, dull. She reports associated hematuria but states hat she chronically has hematuria. No fevers, chills, diarrhea, nausea. In addition, she reports R sided neck stiffness. No fevers, sore throat, hoarse voice or difficulty swallowing.

## 2022-10-25 LAB
CULTURE RESULTS: SIGNIFICANT CHANGE UP
SPECIMEN SOURCE: SIGNIFICANT CHANGE UP

## 2022-11-09 ENCOUNTER — EMERGENCY (EMERGENCY)
Facility: HOSPITAL | Age: 72
LOS: 1 days | Discharge: HOME | End: 2022-11-09
Attending: EMERGENCY MEDICINE
Payer: MEDICARE

## 2022-11-09 VITALS
RESPIRATION RATE: 17 BRPM | OXYGEN SATURATION: 95 % | HEART RATE: 82 BPM | TEMPERATURE: 98 F | SYSTOLIC BLOOD PRESSURE: 129 MMHG | DIASTOLIC BLOOD PRESSURE: 78 MMHG

## 2022-11-09 VITALS
TEMPERATURE: 98 F | DIASTOLIC BLOOD PRESSURE: 73 MMHG | OXYGEN SATURATION: 98 % | SYSTOLIC BLOOD PRESSURE: 137 MMHG | WEIGHT: 125 LBS | RESPIRATION RATE: 19 BRPM | HEIGHT: 63 IN | HEART RATE: 95 BPM

## 2022-11-09 DIAGNOSIS — Z87.19 PERSONAL HISTORY OF OTHER DISEASES OF THE DIGESTIVE SYSTEM: Chronic | ICD-10-CM

## 2022-11-09 DIAGNOSIS — Z98.89 OTHER SPECIFIED POSTPROCEDURAL STATES: Chronic | ICD-10-CM

## 2022-11-09 DIAGNOSIS — N83.20 UNSPECIFIED OVARIAN CYSTS: Chronic | ICD-10-CM

## 2022-11-09 LAB
ANION GAP SERPL CALC-SCNC: 12 MMOL/L — SIGNIFICANT CHANGE UP (ref 5–17)
BASOPHILS # BLD AUTO: 0.06 K/UL — SIGNIFICANT CHANGE UP (ref 0–0.2)
BASOPHILS NFR BLD AUTO: 0.8 % — SIGNIFICANT CHANGE UP (ref 0–2)
BUN SERPL-MCNC: 19.2 MG/DL — SIGNIFICANT CHANGE UP (ref 8–20)
CALCIUM SERPL-MCNC: 9 MG/DL — SIGNIFICANT CHANGE UP (ref 8.4–10.5)
CHLORIDE SERPL-SCNC: 105 MMOL/L — SIGNIFICANT CHANGE UP (ref 96–108)
CK SERPL-CCNC: 57 U/L — SIGNIFICANT CHANGE UP (ref 25–170)
CO2 SERPL-SCNC: 28 MMOL/L — SIGNIFICANT CHANGE UP (ref 22–29)
CREAT SERPL-MCNC: 1.55 MG/DL — HIGH (ref 0.5–1.3)
EGFR: 36 ML/MIN/1.73M2 — LOW
EOSINOPHIL # BLD AUTO: 0.07 K/UL — SIGNIFICANT CHANGE UP (ref 0–0.5)
EOSINOPHIL NFR BLD AUTO: 0.9 % — SIGNIFICANT CHANGE UP (ref 0–6)
GLUCOSE SERPL-MCNC: 89 MG/DL — SIGNIFICANT CHANGE UP (ref 70–99)
HCT VFR BLD CALC: 38.5 % — SIGNIFICANT CHANGE UP (ref 34.5–45)
HGB BLD-MCNC: 13 G/DL — SIGNIFICANT CHANGE UP (ref 11.5–15.5)
IMM GRANULOCYTES NFR BLD AUTO: 0.4 % — SIGNIFICANT CHANGE UP (ref 0–0.9)
LYMPHOCYTES # BLD AUTO: 1.48 K/UL — SIGNIFICANT CHANGE UP (ref 1–3.3)
LYMPHOCYTES # BLD AUTO: 19 % — SIGNIFICANT CHANGE UP (ref 13–44)
MCHC RBC-ENTMCNC: 30.7 PG — SIGNIFICANT CHANGE UP (ref 27–34)
MCHC RBC-ENTMCNC: 33.8 GM/DL — SIGNIFICANT CHANGE UP (ref 32–36)
MCV RBC AUTO: 90.8 FL — SIGNIFICANT CHANGE UP (ref 80–100)
MONOCYTES # BLD AUTO: 1.3 K/UL — HIGH (ref 0–0.9)
MONOCYTES NFR BLD AUTO: 16.7 % — HIGH (ref 2–14)
NEUTROPHILS # BLD AUTO: 4.84 K/UL — SIGNIFICANT CHANGE UP (ref 1.8–7.4)
NEUTROPHILS NFR BLD AUTO: 62.2 % — SIGNIFICANT CHANGE UP (ref 43–77)
PLATELET # BLD AUTO: 289 K/UL — SIGNIFICANT CHANGE UP (ref 150–400)
POTASSIUM SERPL-MCNC: 4.2 MMOL/L — SIGNIFICANT CHANGE UP (ref 3.5–5.3)
POTASSIUM SERPL-SCNC: 4.2 MMOL/L — SIGNIFICANT CHANGE UP (ref 3.5–5.3)
RBC # BLD: 4.24 M/UL — SIGNIFICANT CHANGE UP (ref 3.8–5.2)
RBC # FLD: 14.6 % — HIGH (ref 10.3–14.5)
SODIUM SERPL-SCNC: 144 MMOL/L — SIGNIFICANT CHANGE UP (ref 135–145)
TROPONIN T SERPL-MCNC: <0.01 NG/ML — SIGNIFICANT CHANGE UP (ref 0–0.06)
WBC # BLD: 7.78 K/UL — SIGNIFICANT CHANGE UP (ref 3.8–10.5)
WBC # FLD AUTO: 7.78 K/UL — SIGNIFICANT CHANGE UP (ref 3.8–10.5)

## 2022-11-09 PROCEDURE — 85025 COMPLETE CBC W/AUTO DIFF WBC: CPT

## 2022-11-09 PROCEDURE — 36415 COLL VENOUS BLD VENIPUNCTURE: CPT

## 2022-11-09 PROCEDURE — 82962 GLUCOSE BLOOD TEST: CPT

## 2022-11-09 PROCEDURE — 82550 ASSAY OF CK (CPK): CPT

## 2022-11-09 PROCEDURE — 93010 ELECTROCARDIOGRAM REPORT: CPT

## 2022-11-09 PROCEDURE — 99283 EMERGENCY DEPT VISIT LOW MDM: CPT

## 2022-11-09 PROCEDURE — 99285 EMERGENCY DEPT VISIT HI MDM: CPT

## 2022-11-09 PROCEDURE — 84484 ASSAY OF TROPONIN QUANT: CPT

## 2022-11-09 PROCEDURE — 80048 BASIC METABOLIC PNL TOTAL CA: CPT

## 2022-11-09 PROCEDURE — 93005 ELECTROCARDIOGRAM TRACING: CPT

## 2022-11-09 RX ORDER — ONDANSETRON 8 MG/1
1 TABLET, FILM COATED ORAL
Qty: 9 | Refills: 0
Start: 2022-11-09 | End: 2022-11-11

## 2022-11-09 RX ORDER — ONDANSETRON 8 MG/1
4 TABLET, FILM COATED ORAL ONCE
Refills: 0 | Status: COMPLETED | OUTPATIENT
Start: 2022-11-09 | End: 2022-11-09

## 2022-11-09 NOTE — ED PROVIDER NOTE - PATIENT PORTAL LINK FT
You can access the FollowMyHealth Patient Portal offered by [facility] by registering at the following website: [email or web address]. By joining [employer]’s FollowMyHealth portal, you will also be able to view your health information using other applications (apps) compatible with our system.

## 2022-11-09 NOTE — ED PROVIDER NOTE - OBJECTIVE STATEMENT
pt preesents to ED 2/2 "I got so weak last night I just layed down on floor and daughter found me like this " per paperwork with pt she had complete w/u [facility] yesterdays labs nml ct lumbar spine and abd nml dx with uti dispoed on vantin and diflucan but never went to pharmacy to get medications. at my evaluation she denies  fever. denies HA or neck pain. no chest pain or sob. no abd pain. no n/v/d. + urinary f/u/d. no back pain. no motor or sensory deficits. denies illicit drug use. no recent high risk travel. no rash. no other acute issues symptoms or concerns

## 2022-11-09 NOTE — ED ADULT TRIAGE NOTE - CHIEF COMPLAINT QUOTE
Pt BIBA from home s/p 2 unwitnessed syncopal events at home.  As per EMS, pt was seen and diagnosed 3 days ago at OSH with a kidney infection.  Pt states she has not been feeling well since saturday and has been weak with poor PO intake.  Pt does not remember either syncopal event.  As per ems, family found pt on floor at home this morning awake.  Negative blood thinners.  PMH DM; FS wnl on arrival to ED

## 2022-11-09 NOTE — ED PROVIDER NOTE - PHYSICAL EXAMINATION
neuro: CN II - XII intact, EOMI, PERRL, no papilledema, 5/5 muscle strength x 4 extremities, no sensory deficits, 2+ dtr globally, negative babinski, no ataxic gait, normal TEO and FNT, normal romberg

## 2022-11-09 NOTE — ED ADULT NURSE NOTE - OBJECTIVE STATEMENT
Pt arrived in ED with c/o weakness and syncopal episode. Pt is unsure of what happened. She reported she found herself on the floor and is unsure of time it occurred. Pt is A&Ox4, communicates effectively. Denies fevers, CP, abd pain, n/v, dizziness. Skin warm dry intact. Resps reg, nonlabored. Continuous cardiac monitoring in progress. Nursing will continue to monitor. NAD at this time.

## 2022-11-09 NOTE — ED PROVIDER NOTE - PROGRESS NOTE DETAILS
pt well appearing to me no neuro deficits with son at bedside reviewed [facility] w/u with them will recheck labs plan of rd/c home if feeling better. nml aniceto and nml ct abd pelv from [facility] yesterday reviewed with pt and pts family member at bedside in bed in nad son at bedside states ok for dispo hopme will be with pt deb workup reviewed againa return precautions given to pt and ptys son at bedside

## 2022-11-15 ENCOUNTER — APPOINTMENT (OUTPATIENT)
Dept: ORTHOPEDIC SURGERY | Facility: CLINIC | Age: 72
End: 2022-11-15

## 2022-11-15 VITALS
DIASTOLIC BLOOD PRESSURE: 63 MMHG | HEART RATE: 72 BPM | HEIGHT: 63 IN | SYSTOLIC BLOOD PRESSURE: 96 MMHG | WEIGHT: 125 LBS | BODY MASS INDEX: 22.15 KG/M2

## 2022-11-15 DIAGNOSIS — M79.646 PAIN IN UNSPECIFIED FINGER(S): ICD-10-CM

## 2022-11-15 DIAGNOSIS — M25.532 PAIN IN LEFT WRIST: ICD-10-CM

## 2022-11-15 DIAGNOSIS — M25.531 PAIN IN RIGHT WRIST: ICD-10-CM

## 2022-11-15 PROCEDURE — 73110 X-RAY EXAM OF WRIST: CPT | Mod: 50

## 2022-11-15 PROCEDURE — 99214 OFFICE O/P EST MOD 30 MIN: CPT

## 2022-11-15 NOTE — HISTORY OF PRESENT ILLNESS
[FreeTextEntry1] : Audra is a pleasant 71-year-old female who unfortunately has struggled with significant right greater than left wrist and thumb pain and has had multiple prior injections to different levels of improvement as well as bracing.  She is now considering surgery

## 2022-11-15 NOTE — ASSESSMENT
[FreeTextEntry1] : ASSESSMENT:\par \par The patient comes in today with chronic right greater than left wrist pain in the form of tendinitis as well as thumb pain in the form of arthritis.  She has failed prior injections and splinting.  This is significantly aggravating her\par [This is likely to diminish bodily function for the extremity.] \par \par [For this I was able to review other physician’s note(s)]\par [I was able to review other tests or imaging results] \par \par [The patient was given an injection today.]\par \par She was adequately and thoroughly informed of my assessment of their current condition(s). \par \par Surgical Discussion Thumb CMC and 1st Dorsal  Compartment Release:\par \par For her left thumb CMC joint arthritis we discussed nonoperative and operative modalities.  At this point the patient has exhausted all nonoperative modalities and would like to proceed with surgical management.  We discussed surgical management in the form of a thumb CMC joint denervation or arthroplasty with trapeziectomy.  We discussed interposition as well.  They chose to proceed with the surgery.\par \par We discussed the postoperative rehabilitation associated to this procedure.  They verbalized complete understanding and willingness to proceed\par \par Surgical Consent Discussion:\par \par I explained the risks and benefits of surgical intervention to the patient. The risks include, but are not limited to: pain, infection, swelling, stiffness, injury to underlying neurovascular structures, scar sensitivity, incomplete resolution of symptoms, the possibility of the need for future surgery and finally the need to comply with a post-operative occupational therapy protocol. The patient understands, agrees and informed consent was obtained. The patient’s surgery will be scheduled in the near future.\par \par The patient complains of recurrent symptoms. We discussed treatment options. Shared decision making was undertaken. I recommend that the patient undergo release of the 1st dorsal compartment. The risks benefits and alternatives were discussed with the patient. This included, post limited to nerve injury, anesthetic block injury, instability of the tendons, persistent pain, infection, CRPS, need for additional surgery, scar sensitivity, etc.\par \par \par \par

## 2022-11-15 NOTE — PHYSICAL EXAM
[de-identified] : General Exam:\par \par [The patient is alert and oriented, is well appearing, and in no apparent distress]\par [Cervical spine mobility is full in all directions]\par [There are no apparent skin lesions, ulcers, or masses]\par [Inspection of the extremities shows no signs of skin changes or muscle asymmetry]\par \par Examination of the right wrist reveals significant tenderness overlying the first dorsal compartment with a positive Finkelstein.\par Examination of the right thumb at the CMC joint elicits significant discomfort as well as positive grind for pain and crepitus.  She has very mild hyperextension of the MP joint\par \par Examination of the left wrist reveals similar findings with discomfort at the first dorsal compartment and at the CMC joint.\par \par  [de-identified] : [4] views of bilateral hands and wrists were obtained today in my office and were seen by me and discussed with the patient. \par These show findings consistent with right greater than left thumb CMC joint osteoarthritis\par

## 2022-11-15 NOTE — DISCUSSION/SUMMARY
[FreeTextEntry1] : 1.  She has elected to proceed with a right thumb CMC arthroplasty with interposition, right first dorsal compartment release and Emmanuelle synovectomy.\par \par #2 I would like to see her back 2 weeks after surgery for a clinical assessment

## 2022-11-15 NOTE — CONSULT LETTER
[Dear  ___] : Dear  [unfilled], [Consult Letter:] : I had the pleasure of evaluating your patient, [unfilled]. [Please see my note below.] : Please see my note below. [Sincerely,] : Sincerely, [FreeTextEntry2] : YESENIA JOSEPH\par  [FreeTextEntry3] : Ulises Walker MD\par [facility] [place]\par [facility]\par [address]\[place] [address]\par Tel [phone]\par Fax [phone] \par \par For same day and next day orthopedic appointments contact:\par [email or web address] |[phone])\par Appointments available nights and weekends!  \par \par [facility] [place]\par Visit us at [email or web address]\par \par

## 2022-12-22 NOTE — ED CDU PROVIDER SUBSEQUENT DAY NOTE - DISCUSSED CLINICAL AND RADIOLOGICAL FINDINGS WITH, MDM
Treatment Device Design After Initial Simulation Justification (Will Render If Bill For Treatment Devices = Yes): The patient is status post radiation simulation and is evaluated as to the use of additional devices for shielding and placement for radiation therapy. patient/family

## 2023-01-05 ENCOUNTER — OUTPATIENT (OUTPATIENT)
Dept: OUTPATIENT SERVICES | Facility: HOSPITAL | Age: 73
LOS: 1 days | End: 2023-01-05
Payer: MEDICARE

## 2023-01-05 DIAGNOSIS — N83.20 UNSPECIFIED OVARIAN CYSTS: Chronic | ICD-10-CM

## 2023-01-05 DIAGNOSIS — Z98.89 OTHER SPECIFIED POSTPROCEDURAL STATES: Chronic | ICD-10-CM

## 2023-01-05 DIAGNOSIS — Z87.19 PERSONAL HISTORY OF OTHER DISEASES OF THE DIGESTIVE SYSTEM: Chronic | ICD-10-CM

## 2023-01-05 DIAGNOSIS — Z01.818 ENCOUNTER FOR OTHER PREPROCEDURAL EXAMINATION: ICD-10-CM

## 2023-01-05 LAB
A1C WITH ESTIMATED AVERAGE GLUCOSE RESULT: 5.8 % — HIGH (ref 4–5.6)
ANION GAP SERPL CALC-SCNC: 9 MMOL/L — SIGNIFICANT CHANGE UP (ref 5–17)
APTT BLD: 26.4 SEC — LOW (ref 27.5–35.5)
BASOPHILS # BLD AUTO: 0.07 K/UL — SIGNIFICANT CHANGE UP (ref 0–0.2)
BASOPHILS NFR BLD AUTO: 0.9 % — SIGNIFICANT CHANGE UP (ref 0–2)
BUN SERPL-MCNC: 24.6 MG/DL — HIGH (ref 8–20)
CALCIUM SERPL-MCNC: 9.4 MG/DL — SIGNIFICANT CHANGE UP (ref 8.4–10.5)
CHLORIDE SERPL-SCNC: 105 MMOL/L — SIGNIFICANT CHANGE UP (ref 96–108)
CO2 SERPL-SCNC: 30 MMOL/L — HIGH (ref 22–29)
CREAT SERPL-MCNC: 1.35 MG/DL — HIGH (ref 0.5–1.3)
EGFR: 42 ML/MIN/1.73M2 — LOW
EOSINOPHIL # BLD AUTO: 0.11 K/UL — SIGNIFICANT CHANGE UP (ref 0–0.5)
EOSINOPHIL NFR BLD AUTO: 1.4 % — SIGNIFICANT CHANGE UP (ref 0–6)
ESTIMATED AVERAGE GLUCOSE: 120 MG/DL — HIGH (ref 68–114)
GLUCOSE SERPL-MCNC: 150 MG/DL — HIGH (ref 70–99)
HCT VFR BLD CALC: 43 % — SIGNIFICANT CHANGE UP (ref 34.5–45)
HGB BLD-MCNC: 13.4 G/DL — SIGNIFICANT CHANGE UP (ref 11.5–15.5)
IMM GRANULOCYTES NFR BLD AUTO: 0.5 % — SIGNIFICANT CHANGE UP (ref 0–0.9)
INR BLD: 0.97 RATIO — SIGNIFICANT CHANGE UP (ref 0.88–1.16)
LYMPHOCYTES # BLD AUTO: 1.89 K/UL — SIGNIFICANT CHANGE UP (ref 1–3.3)
LYMPHOCYTES # BLD AUTO: 24 % — SIGNIFICANT CHANGE UP (ref 13–44)
MCHC RBC-ENTMCNC: 29.4 PG — SIGNIFICANT CHANGE UP (ref 27–34)
MCHC RBC-ENTMCNC: 31.2 GM/DL — LOW (ref 32–36)
MCV RBC AUTO: 94.3 FL — SIGNIFICANT CHANGE UP (ref 80–100)
MONOCYTES # BLD AUTO: 0.85 K/UL — SIGNIFICANT CHANGE UP (ref 0–0.9)
MONOCYTES NFR BLD AUTO: 10.8 % — SIGNIFICANT CHANGE UP (ref 2–14)
NEUTROPHILS # BLD AUTO: 4.92 K/UL — SIGNIFICANT CHANGE UP (ref 1.8–7.4)
NEUTROPHILS NFR BLD AUTO: 62.4 % — SIGNIFICANT CHANGE UP (ref 43–77)
PLATELET # BLD AUTO: 384 K/UL — SIGNIFICANT CHANGE UP (ref 150–400)
POTASSIUM SERPL-MCNC: 5 MMOL/L — SIGNIFICANT CHANGE UP (ref 3.5–5.3)
POTASSIUM SERPL-SCNC: 5 MMOL/L — SIGNIFICANT CHANGE UP (ref 3.5–5.3)
PROTHROM AB SERPL-ACNC: 11.3 SEC — SIGNIFICANT CHANGE UP (ref 10.5–13.4)
RBC # BLD: 4.56 M/UL — SIGNIFICANT CHANGE UP (ref 3.8–5.2)
RBC # FLD: 15.2 % — HIGH (ref 10.3–14.5)
SODIUM SERPL-SCNC: 144 MMOL/L — SIGNIFICANT CHANGE UP (ref 135–145)
WBC # BLD: 7.88 K/UL — SIGNIFICANT CHANGE UP (ref 3.8–10.5)
WBC # FLD AUTO: 7.88 K/UL — SIGNIFICANT CHANGE UP (ref 3.8–10.5)

## 2023-01-05 PROCEDURE — 93010 ELECTROCARDIOGRAM REPORT: CPT

## 2023-01-05 PROCEDURE — 85730 THROMBOPLASTIN TIME PARTIAL: CPT

## 2023-01-05 PROCEDURE — 85610 PROTHROMBIN TIME: CPT

## 2023-01-05 PROCEDURE — 80048 BASIC METABOLIC PNL TOTAL CA: CPT

## 2023-01-05 PROCEDURE — 83036 HEMOGLOBIN GLYCOSYLATED A1C: CPT

## 2023-01-05 PROCEDURE — G0463: CPT

## 2023-01-05 PROCEDURE — 93005 ELECTROCARDIOGRAM TRACING: CPT

## 2023-01-05 PROCEDURE — 85025 COMPLETE CBC W/AUTO DIFF WBC: CPT

## 2023-01-05 PROCEDURE — 36415 COLL VENOUS BLD VENIPUNCTURE: CPT

## 2023-01-16 ENCOUNTER — APPOINTMENT (OUTPATIENT)
Dept: ORTHOPEDIC SURGERY | Facility: AMBULATORY SURGERY CENTER | Age: 73
End: 2023-01-16
Payer: MEDICARE

## 2023-01-16 PROCEDURE — 25447 ARTHRP NTRCRP/CRP/MTCR NTRPS: CPT | Mod: RT

## 2023-01-16 PROCEDURE — 25116 REMOVE WRIST/FOREARM LESION: CPT | Mod: RT

## 2023-01-16 PROCEDURE — 26480 TRANSPLANT HAND TENDON: CPT | Mod: RT

## 2023-01-16 RX ORDER — OXYCODONE 5 MG/1
5 TABLET ORAL EVERY 6 HOURS
Qty: 24 | Refills: 0 | Status: ACTIVE | COMMUNITY
Start: 2023-01-16 | End: 1900-01-01

## 2023-01-31 ENCOUNTER — APPOINTMENT (OUTPATIENT)
Dept: ORTHOPEDIC SURGERY | Facility: CLINIC | Age: 73
End: 2023-01-31
Payer: MEDICARE

## 2023-01-31 PROCEDURE — 99024 POSTOP FOLLOW-UP VISIT: CPT

## 2023-01-31 NOTE — HISTORY OF PRESENT ILLNESS
[de-identified] : s/p right thumb carpometacarpal joint arthroplasty, interposition tendon transfer. Right wrist first dorsal compartment release and tenosynovectomy. 1/16/23 no rebound tenderness/soft/prominent/nontender/no masses palpable/bowel sounds normal/no rigidity/no guarding/no organomegaly [de-identified] : Nicolette returns for follow-up.  She is here with her friend.  She is delighted with the results of the surgery.  She denies pain [de-identified] : She is well-appearing on examination.  Examination of the right wrist reveals an incision that is healing excellently without signs of infection.  The thumb metacarpal is stable on assessment.  She has minimal stiffness [de-identified] : We are both delighted with the results of the surgery.  At this point in time it is prudent for her to commence seeing occupational therapy to further augment on range of motion without strengthening at this point.  She will need a hand-based CMC orthosis for the right thumb [de-identified] : 1.  I would like to see her back in 4 weeks for a clinical assessment

## 2023-03-06 ENCOUNTER — APPOINTMENT (OUTPATIENT)
Dept: ORTHOPEDIC SURGERY | Facility: CLINIC | Age: 73
End: 2023-03-06
Payer: MEDICARE

## 2023-03-06 VITALS
BODY MASS INDEX: 22.15 KG/M2 | DIASTOLIC BLOOD PRESSURE: 74 MMHG | SYSTOLIC BLOOD PRESSURE: 110 MMHG | HEIGHT: 63 IN | WEIGHT: 125 LBS | HEART RATE: 116 BPM

## 2023-03-06 DIAGNOSIS — E11.22 TYPE 2 DIABETES MELLITUS WITH DIABETIC CHRONIC KIDNEY DISEASE: ICD-10-CM

## 2023-03-06 PROCEDURE — 73523 X-RAY EXAM HIPS BI 5/> VIEWS: CPT

## 2023-03-06 PROCEDURE — 99214 OFFICE O/P EST MOD 30 MIN: CPT | Mod: 25

## 2023-03-06 PROCEDURE — 20610 DRAIN/INJ JOINT/BURSA W/O US: CPT | Mod: 50

## 2023-03-06 NOTE — PHYSICAL EXAM
[Antalgic] : not antalgic [de-identified] : GENERAL APPEARANCE: Well nourished and hydrated, pleasant, alert, and oriented x 3. Appears their stated age. \par HEENT: Normocephalic, extraocular eye motion intact. Nasal septum midline. Oral cavity clear. External auditory canal clear. \par RESPIRATORY: Breath sounds clear and audible in all lobes. No wheezing, No accessory muscle use.\par CARDIOVASCULAR: No apparent abnormalities. No lower leg edema. No varicosities. Pedal pulses are palpable.\par NEUROLOGIC: Sensation is normal, no muscle weakness in the upper or lower extremities.\par DERMATOLOGIC: No apparent skin lesions, moist, warm, no rash.\par SPINE: Cervical spine appears normal and moves freely; thoracic spine appears normal and moves freely; lumbosacral spine appears normal and moves freely, normal, nontender.\par MUSCULOSKELETAL: Hands, wrists, and elbows are normal and move freely, shoulders are normal and move freely. \par Musculoskeletal: Gait: normal . \par \par Bilateral hip examination demonstrates exquisite tenderness over the bursae without stiffness or limited ROM, neg stinchfiled\par Bilateral knees show no effusion full range of motion exquisite tenderness to palpation along the patella and medial joint line no instability\par  [de-identified] : 5V xray of the b/l hip done in the office today and reviewed by Dr. Joe Shepard demonstrates mild hip osteoarthritis bilaterally.

## 2023-03-06 NOTE — HISTORY OF PRESENT ILLNESS
[Pain Location] : pain [7] : a current pain level of 7/10 [8] : a maximum pain level of 8/10 [de-identified] : 72 year old female presents for b/l hip pain for 3 M .  Patient has seen for her right hip in the past and received hip bursa injection which provided good relief few years ago . she denies left knee pain today . she had MRI 1/2019 with partial tear of lateral meniscus. no other internal derangement. cortisone injection given last oct 2018, provided good pain relief. \par \par Today she c/o severe bilateral the pain is on the lateral aspect of the hip. Pt has chronic lower back pain treated with intermittent injections by Dr. Medina. \par she notes difficulty with walking and laying on side due to pain.\par she requested repeat injections today.\par pt is on plavix and unable to take NSAIDs\par pt states she started prednisone yesterday for her eye and pt feels no pain in the hip nor the knee\par She states DM is well controlled at this time and states her last A1C was normal. \par

## 2023-03-06 NOTE — REASON FOR VISIT
[Follow-Up Visit] : a follow-up visit for [Family Member] : family member [FreeTextEntry2] : bilateral hips

## 2023-03-06 NOTE — END OF VISIT
[FreeTextEntry3] : I, Rosanne Moore, acted solely as a scribe for Dr. Joe Shepard on this date 03/06/2023 .

## 2023-03-06 NOTE — DISCUSSION/SUMMARY
[de-identified] : 71 y/o F presents with bilateral hip bursitis. She demonstrates mild hip osteoarthritis bilaterally on today's imaging. She has increased pain and decreased mobility. She reports great relief after last injection done in 10/2018. The patient elected to receive a cortisone injection in their bilateral trochanteric bursa today and tolerated well. F/u with us in 3 months as needed for repeat cortisone injection. \par

## 2023-03-06 NOTE — PROCEDURE
[de-identified] : I injected the patient's bilateral hip bursa with cortisone greater trochanteric bursitis.\par \par I discussed at length with the patient the planned steroid and lidocaine injection for hip bursitis. The risks, benefits, convalescence and alternatives were reviewed and pt consents. The possible side effects discussed included but were not limited to: pain, swelling, heat, bleeding, and redness. Symptoms are generally mild but if they are extensive then contact the office. Giving pain relievers by mouth such as NSAIDs or Tylenol can generally treat the reactions to steroid and lidocaine. Rare cases of infection have been noted. Rash, hives and itching may occur post injection. If you have muscle pain or cramps, flushing and or swelling of the face, rapid heart beat, nausea, dizziness, fever, chills, headache, difficulty breathing, swelling in the arms or legs, or have a prickly feeling of your skin, contact a health care provider immediately. Following this discussion, the hip was prepped with Alcohol and under sterile condition the 80 mg Depo-Medrol and 6 cc Lidocaine injection was performed with a spinal needle through a greater trochanter bursa injection site. The needle was introduced into the bursa and the medication was injected. Upon withdrawal of the needle the site was cleaned with alcohol and a band aid applied. The patient tolerated the injection well and there were no adverse effects. Post injection instructions included no strenuous activity for 24 hours, cryotherapy and if there are any adverse effects to contact the office.

## 2023-03-06 NOTE — REVIEW OF SYSTEMS
[Joint Pain] : joint pain [Joint Stiffness] : joint stiffness [Joint Swelling] : joint swelling [Negative] : Heme/Lymph [FreeTextEntry9] : bilateral hip pain

## 2023-03-07 ENCOUNTER — APPOINTMENT (OUTPATIENT)
Dept: ORTHOPEDIC SURGERY | Facility: CLINIC | Age: 73
End: 2023-03-07
Payer: MEDICARE

## 2023-03-07 PROCEDURE — 99024 POSTOP FOLLOW-UP VISIT: CPT

## 2023-03-07 NOTE — HISTORY OF PRESENT ILLNESS
[de-identified] : s/p right thumb carpometacarpal joint arthroplasty, interposition tendon transfer. Right wrist first dorsal compartment release and tenosynovectomy. 1/16/23 [de-identified] : Nicolette returns for follow-up. She is delighted with the results of the surgery.  Pain is well controlled [de-identified] : She is well-appearing on examination.  Examination of the right wrist reveals an incision that is healing excellently without signs of infection.  The thumb metacarpal is stable on assessment.  She has minimal stiffness [de-identified] : We are both delighted with the results of the surgery.  At this point she will continue with occupational therapy and progress onto a softer brace and strengthening in the next few weeks. [de-identified] : 1.  I would like to see her back in 4 weeks for a clinical assessment

## 2023-04-19 ENCOUNTER — APPOINTMENT (OUTPATIENT)
Dept: ORTHOPEDIC SURGERY | Facility: CLINIC | Age: 73
End: 2023-04-19
Payer: MEDICARE

## 2023-04-19 VITALS
SYSTOLIC BLOOD PRESSURE: 127 MMHG | HEART RATE: 82 BPM | BODY MASS INDEX: 22.15 KG/M2 | DIASTOLIC BLOOD PRESSURE: 76 MMHG | HEIGHT: 63 IN | TEMPERATURE: 97.9 F | WEIGHT: 125 LBS

## 2023-04-19 DIAGNOSIS — M18.11 UNILATERAL PRIMARY OSTEOARTHRITIS OF FIRST CARPOMETACARPAL JOINT, RIGHT HAND: ICD-10-CM

## 2023-04-19 PROCEDURE — 99213 OFFICE O/P EST LOW 20 MIN: CPT

## 2023-04-19 NOTE — ASSESSMENT
[FreeTextEntry1] : We are delighted with the results of the surgery.  At this point in time she will continue with activities as tolerated and strengthening.\par \par Discussion:\par \par 1.  We will see each other again in 3 months time to reassess her right basal joint surgery status.  She is doing excellently

## 2023-04-19 NOTE — REASON FOR VISIT
[Follow-Up Visit] : a follow-up visit for [Hand Pain] : hand pain [FreeTextEntry2] : right hand follow up

## 2023-04-19 NOTE — HISTORY OF PRESENT ILLNESS
[FreeTextEntry1] : Nicolette returns for follow-up with history of right thumb CMC joint arthroplasty on 1-.  At this point she tells me that she feels extremely well without any discomfort.  She is back to activities of daily living.

## 2023-04-19 NOTE — PHYSICAL EXAM
[de-identified] : She is well-appearing on exam.  Examination of the right wrist reveals an incision that is healed excellently without any signs of infection.  The thumb metacarpal is stable as well as the MCP joint.  There is excellent range of motion and opposition towards the small finger.

## 2023-06-17 ENCOUNTER — INPATIENT (INPATIENT)
Facility: HOSPITAL | Age: 73
LOS: 2 days | Discharge: HOME | DRG: 872 | End: 2023-06-20
Attending: INTERNAL MEDICINE | Admitting: STUDENT IN AN ORGANIZED HEALTH CARE EDUCATION/TRAINING PROGRAM
Payer: MEDICARE

## 2023-06-17 VITALS
TEMPERATURE: 101 F | SYSTOLIC BLOOD PRESSURE: 127 MMHG | HEART RATE: 117 BPM | WEIGHT: 126.99 LBS | RESPIRATION RATE: 20 BRPM | DIASTOLIC BLOOD PRESSURE: 72 MMHG | OXYGEN SATURATION: 98 %

## 2023-06-17 DIAGNOSIS — Z98.89 OTHER SPECIFIED POSTPROCEDURAL STATES: Chronic | ICD-10-CM

## 2023-06-17 DIAGNOSIS — Z87.19 PERSONAL HISTORY OF OTHER DISEASES OF THE DIGESTIVE SYSTEM: Chronic | ICD-10-CM

## 2023-06-17 DIAGNOSIS — N83.20 UNSPECIFIED OVARIAN CYSTS: Chronic | ICD-10-CM

## 2023-06-17 LAB
ALBUMIN SERPL ELPH-MCNC: 3.7 G/DL — SIGNIFICANT CHANGE UP (ref 3.3–5.2)
ALP SERPL-CCNC: 102 U/L — SIGNIFICANT CHANGE UP (ref 40–120)
ALT FLD-CCNC: 10 U/L — SIGNIFICANT CHANGE UP
ANION GAP SERPL CALC-SCNC: 13 MMOL/L — SIGNIFICANT CHANGE UP (ref 5–17)
ANISOCYTOSIS BLD QL: SLIGHT — SIGNIFICANT CHANGE UP
APPEARANCE UR: CLEAR — SIGNIFICANT CHANGE UP
APTT BLD: 29.5 SEC — SIGNIFICANT CHANGE UP (ref 27.5–35.5)
AST SERPL-CCNC: 23 U/L — SIGNIFICANT CHANGE UP
BACTERIA # UR AUTO: ABNORMAL
BASOPHILS # BLD AUTO: 0 K/UL — SIGNIFICANT CHANGE UP (ref 0–0.2)
BASOPHILS NFR BLD AUTO: 0 % — SIGNIFICANT CHANGE UP (ref 0–2)
BILIRUB SERPL-MCNC: 0.7 MG/DL — SIGNIFICANT CHANGE UP (ref 0.4–2)
BILIRUB UR-MCNC: NEGATIVE — SIGNIFICANT CHANGE UP
BUN SERPL-MCNC: 21.2 MG/DL — HIGH (ref 8–20)
CALCIUM SERPL-MCNC: 9 MG/DL — SIGNIFICANT CHANGE UP (ref 8.4–10.5)
CHLORIDE SERPL-SCNC: 94 MMOL/L — LOW (ref 96–108)
CO2 SERPL-SCNC: 25 MMOL/L — SIGNIFICANT CHANGE UP (ref 22–29)
COLOR SPEC: YELLOW — SIGNIFICANT CHANGE UP
CREAT SERPL-MCNC: 1.55 MG/DL — HIGH (ref 0.5–1.3)
DIFF PNL FLD: ABNORMAL
EGFR: 35 ML/MIN/1.73M2 — LOW
EOSINOPHIL # BLD AUTO: 0 K/UL — SIGNIFICANT CHANGE UP (ref 0–0.5)
EOSINOPHIL NFR BLD AUTO: 0 % — SIGNIFICANT CHANGE UP (ref 0–6)
EPI CELLS # UR: SIGNIFICANT CHANGE UP
GIANT PLATELETS BLD QL SMEAR: PRESENT — SIGNIFICANT CHANGE UP
GLUCOSE SERPL-MCNC: 115 MG/DL — HIGH (ref 70–99)
GLUCOSE UR QL: NEGATIVE — SIGNIFICANT CHANGE UP
HCT VFR BLD CALC: 38.2 % — SIGNIFICANT CHANGE UP (ref 34.5–45)
HGB BLD-MCNC: 12.8 G/DL — SIGNIFICANT CHANGE UP (ref 11.5–15.5)
INR BLD: 1.25 RATIO — HIGH (ref 0.88–1.16)
KETONES UR-MCNC: NEGATIVE — SIGNIFICANT CHANGE UP
LACTATE BLDV-MCNC: 1.4 MMOL/L — SIGNIFICANT CHANGE UP (ref 0.5–2)
LEUKOCYTE ESTERASE UR-ACNC: ABNORMAL
LIDOCAIN IGE QN: 48 U/L — SIGNIFICANT CHANGE UP (ref 22–51)
LYMPHOCYTES # BLD AUTO: 1.98 K/UL — SIGNIFICANT CHANGE UP (ref 1–3.3)
LYMPHOCYTES # BLD AUTO: 8.8 % — LOW (ref 13–44)
MANUAL SMEAR VERIFICATION: SIGNIFICANT CHANGE UP
MCHC RBC-ENTMCNC: 29.7 PG — SIGNIFICANT CHANGE UP (ref 27–34)
MCHC RBC-ENTMCNC: 33.5 GM/DL — SIGNIFICANT CHANGE UP (ref 32–36)
MCV RBC AUTO: 88.6 FL — SIGNIFICANT CHANGE UP (ref 80–100)
MONOCYTES # BLD AUTO: 2.36 K/UL — HIGH (ref 0–0.9)
MONOCYTES NFR BLD AUTO: 10.5 % — SIGNIFICANT CHANGE UP (ref 2–14)
NEUTROPHILS # BLD AUTO: 17.55 K/UL — HIGH (ref 1.8–7.4)
NEUTROPHILS NFR BLD AUTO: 78.1 % — HIGH (ref 43–77)
NITRITE UR-MCNC: POSITIVE
OVALOCYTES BLD QL SMEAR: SLIGHT — SIGNIFICANT CHANGE UP
PH UR: 7 — SIGNIFICANT CHANGE UP (ref 5–8)
PLAT MORPH BLD: NORMAL — SIGNIFICANT CHANGE UP
PLATELET # BLD AUTO: 313 K/UL — SIGNIFICANT CHANGE UP (ref 150–400)
POIKILOCYTOSIS BLD QL AUTO: SLIGHT — SIGNIFICANT CHANGE UP
POLYCHROMASIA BLD QL SMEAR: SLIGHT — SIGNIFICANT CHANGE UP
POTASSIUM SERPL-MCNC: 4.1 MMOL/L — SIGNIFICANT CHANGE UP (ref 3.5–5.3)
POTASSIUM SERPL-SCNC: 4.1 MMOL/L — SIGNIFICANT CHANGE UP (ref 3.5–5.3)
PROT SERPL-MCNC: 7.2 G/DL — SIGNIFICANT CHANGE UP (ref 6.6–8.7)
PROT UR-MCNC: 30 MG/DL
PROTHROM AB SERPL-ACNC: 14.5 SEC — HIGH (ref 10.5–13.4)
RAPID RVP RESULT: SIGNIFICANT CHANGE UP
RBC # BLD: 4.31 M/UL — SIGNIFICANT CHANGE UP (ref 3.8–5.2)
RBC # FLD: 15.8 % — HIGH (ref 10.3–14.5)
RBC BLD AUTO: ABNORMAL
RBC CASTS # UR COMP ASSIST: ABNORMAL /HPF (ref 0–4)
SARS-COV-2 RNA SPEC QL NAA+PROBE: SIGNIFICANT CHANGE UP
SODIUM SERPL-SCNC: 132 MMOL/L — LOW (ref 135–145)
SP GR SPEC: 1 — LOW (ref 1.01–1.02)
STOMATOCYTES BLD QL SMEAR: SLIGHT — SIGNIFICANT CHANGE UP
UROBILINOGEN FLD QL: NEGATIVE — SIGNIFICANT CHANGE UP
VARIANT LYMPHS # BLD: 2.6 % — SIGNIFICANT CHANGE UP (ref 0–6)
WBC # BLD: 22.47 K/UL — HIGH (ref 3.8–10.5)
WBC # FLD AUTO: 22.47 K/UL — HIGH (ref 3.8–10.5)
WBC UR QL: SIGNIFICANT CHANGE UP /HPF (ref 0–5)

## 2023-06-17 PROCEDURE — 93010 ELECTROCARDIOGRAM REPORT: CPT

## 2023-06-17 PROCEDURE — 74177 CT ABD & PELVIS W/CONTRAST: CPT | Mod: 26,MA

## 2023-06-17 PROCEDURE — 71045 X-RAY EXAM CHEST 1 VIEW: CPT | Mod: 26

## 2023-06-17 PROCEDURE — 99285 EMERGENCY DEPT VISIT HI MDM: CPT

## 2023-06-17 RX ORDER — ACETAMINOPHEN 500 MG
1000 TABLET ORAL ONCE
Refills: 0 | Status: COMPLETED | OUTPATIENT
Start: 2023-06-17 | End: 2023-06-17

## 2023-06-17 RX ORDER — SODIUM CHLORIDE 9 MG/ML
1800 INJECTION, SOLUTION INTRAVENOUS ONCE
Refills: 0 | Status: COMPLETED | OUTPATIENT
Start: 2023-06-17 | End: 2023-06-17

## 2023-06-17 RX ORDER — ONDANSETRON 8 MG/1
4 TABLET, FILM COATED ORAL ONCE
Refills: 0 | Status: COMPLETED | OUTPATIENT
Start: 2023-06-17 | End: 2023-06-17

## 2023-06-17 RX ADMIN — Medication 1000 MILLIGRAM(S): at 20:11

## 2023-06-17 RX ADMIN — SODIUM CHLORIDE 1800 MILLILITER(S): 9 INJECTION, SOLUTION INTRAVENOUS at 19:56

## 2023-06-17 RX ADMIN — Medication 400 MILLIGRAM(S): at 19:56

## 2023-06-17 NOTE — ED PROVIDER NOTE - ATTENDING CONTRIBUTION TO CARE
I personally saw the patient with the resident, and completed the key components of the history and physical exam. I then discussed the management plan with the resident.    73 y/o F with PMH tachycardia (on metoprolol), HTN, DM, hypothyroidism presents for fever, nausea, vomiting x 3 days, unable to take her medications at home. Patient arrives meeting sepsis criteria with fever 101.3 and tachycardic to 117. She denies coughing, sore throat, urinary symptoms.    PE - non-toxic appearing, tachycardic, lungs CTA B/L, abd soft, mild epigastric TTP, no guarding, no CVAT.    sepsis work up with 30 cc/kg fluid bolus, will hold ABx at this time for possible viral etiology - CT A/P and reassess.

## 2023-06-17 NOTE — ED ADULT NURSE NOTE - NSFALLUNIVINTERV_ED_ALL_ED
Bed/Stretcher in lowest position, wheels locked, appropriate side rails in place/Call bell, personal items and telephone in reach/Instruct patient to call for assistance before getting out of bed/chair/stretcher/Non-slip footwear applied when patient is off stretcher/[place] to call system/Physically safe environment - no spills, clutter or unnecessary equipment/Purposeful proactive rounding/Room/bathroom lighting operational, light cord in reach

## 2023-06-17 NOTE — ED ADULT NURSE NOTE - OBJECTIVE STATEMENT
alert and oriented x4. pt presenting to the ED complaining of n/v x3 days and poor hydration, fever x1 day. Denies sick contacts, chest pain. PMH hypothyroidism, tachycardia, HTN. respirations equal/unlabored. IV placed, labs drawn and sent. Continuos cardiac monitoring in place NSR &  2L NC 97%.

## 2023-06-17 NOTE — ED PROVIDER NOTE - NS ED ROS FT
General: Fever, body aches  HEENT: Sore throat  Neck: Denies neck pain  Resp: SOB. Denies coughing  Cardiovascular: Denies CP, palpitations, LE edema  GI: N/V, abd pain. Denies diarrhea, constipation, blood in stool  : Denies dysuria, hematuria  MSK: Denies back pain  Neuro: Denies HA, dizziness, numbness, weakness  Skin: Denies rashes.

## 2023-06-17 NOTE — ED PROVIDER NOTE - PHYSICAL EXAMINATION
General: Awake, alert, lying in bed in NAD. Tired appearing  HEENT: Normocephalic, atraumatic. No scleral icterus or conjunctival injection. EOMI. Moist mucous membranes. Oropharynx clear.   Neck:. Soft and supple.  Cardiac: RRR, Peripheral pulses 2+ and symmetric. No LE edema.  Resp: Lungs CTAB. No accessory muscle use  Abd: Mild epigastric tenderness. Soft, non-distended. No guarding, rebound, or rigidity.  Back: Spine midline and non-tender.   Skin: No rashes, abrasions, or lacerations.  Neuro: AO x 4. Moves all extremities symmetrically. Motor strength and sensation grossly intact.  Psych: Appropriate mood and affect

## 2023-06-17 NOTE — ED PROVIDER NOTE - PROGRESS NOTE DETAILS
Given the significant and immediate threats to this patient based on initial presentation, the benefits of emergency contrast-enhanced CT imaging without obtaining GFR/creatinine serum level results greatly outweigh the potential risk of harm due to contrast-induced nephropathy. Deondre Sparrow MD Pt feels better although still with sore throat. Ambulatory SpO2 96-97%. Discussed findings of leukocytosis and unremarkable CT scan. Deondre Sparrow MD

## 2023-06-17 NOTE — ED PROVIDER NOTE - CLINICAL SUMMARY MEDICAL DECISION MAKING FREE TEXT BOX
72F hx DM, HTN presents for several days of subjective fever, N/V, body aches, SOB, abd pain. On exam pt with epigastric tenderness. Tachycardic, febrile. Will draw sepsis labs, Ct A/P, IVF, fever control.

## 2023-06-17 NOTE — ED ADULT TRIAGE NOTE - CHIEF COMPLAINT QUOTE
Patient presents to ER C/O nausea and vomiting X3 days, denies fever, states feeling weak, not able to eat due to nausea, , resp even/unlabored.

## 2023-06-17 NOTE — ED PROVIDER NOTE - OBJECTIVE STATEMENT
Patient had ekg performed today showed NSR, still having palpitations, with his hx of afib monitor to be ordered.    
72F hx NIDDM, hypothyroidism, HTN, tachycardia on Toprol presents for 3 days of feeling generally unwell. Pt reports fever, body aches, N/V, sore throat, SOB, abd pain. No cough or known sick contact. Has been unable to take her medications today because of vomiting

## 2023-06-18 DIAGNOSIS — R50.9 FEVER, UNSPECIFIED: ICD-10-CM

## 2023-06-18 LAB
ANION GAP SERPL CALC-SCNC: 12 MMOL/L — SIGNIFICANT CHANGE UP (ref 5–17)
ANION GAP SERPL CALC-SCNC: 16 MMOL/L — SIGNIFICANT CHANGE UP (ref 5–17)
BUN SERPL-MCNC: 13.7 MG/DL — SIGNIFICANT CHANGE UP (ref 8–20)
BUN SERPL-MCNC: 14 MG/DL — SIGNIFICANT CHANGE UP (ref 8–20)
CALCIUM SERPL-MCNC: 8.7 MG/DL — SIGNIFICANT CHANGE UP (ref 8.4–10.5)
CALCIUM SERPL-MCNC: 8.7 MG/DL — SIGNIFICANT CHANGE UP (ref 8.4–10.5)
CHLORIDE SERPL-SCNC: 96 MMOL/L — SIGNIFICANT CHANGE UP (ref 96–108)
CHLORIDE SERPL-SCNC: 96 MMOL/L — SIGNIFICANT CHANGE UP (ref 96–108)
CO2 SERPL-SCNC: 24 MMOL/L — SIGNIFICANT CHANGE UP (ref 22–29)
CO2 SERPL-SCNC: 27 MMOL/L — SIGNIFICANT CHANGE UP (ref 22–29)
CREAT SERPL-MCNC: 1.19 MG/DL — SIGNIFICANT CHANGE UP (ref 0.5–1.3)
CREAT SERPL-MCNC: 1.23 MG/DL — SIGNIFICANT CHANGE UP (ref 0.5–1.3)
EGFR: 47 ML/MIN/1.73M2 — LOW
EGFR: 49 ML/MIN/1.73M2 — LOW
GLUCOSE BLDC GLUCOMTR-MCNC: 100 MG/DL — HIGH (ref 70–99)
GLUCOSE BLDC GLUCOMTR-MCNC: 105 MG/DL — HIGH (ref 70–99)
GLUCOSE BLDC GLUCOMTR-MCNC: 111 MG/DL — HIGH (ref 70–99)
GLUCOSE BLDC GLUCOMTR-MCNC: 115 MG/DL — HIGH (ref 70–99)
GLUCOSE SERPL-MCNC: 109 MG/DL — HIGH (ref 70–99)
GLUCOSE SERPL-MCNC: 97 MG/DL — SIGNIFICANT CHANGE UP (ref 70–99)
HCT VFR BLD CALC: 41.1 % — SIGNIFICANT CHANGE UP (ref 34.5–45)
HETEROPH AB TITR SER AGGL: NEGATIVE — SIGNIFICANT CHANGE UP
HGB BLD-MCNC: 13.1 G/DL — SIGNIFICANT CHANGE UP (ref 11.5–15.5)
MCHC RBC-ENTMCNC: 29.2 PG — SIGNIFICANT CHANGE UP (ref 27–34)
MCHC RBC-ENTMCNC: 31.9 GM/DL — LOW (ref 32–36)
MCV RBC AUTO: 91.7 FL — SIGNIFICANT CHANGE UP (ref 80–100)
PLATELET # BLD AUTO: 254 K/UL — SIGNIFICANT CHANGE UP (ref 150–400)
POTASSIUM SERPL-MCNC: 3.5 MMOL/L — SIGNIFICANT CHANGE UP (ref 3.5–5.3)
POTASSIUM SERPL-MCNC: 3.7 MMOL/L — SIGNIFICANT CHANGE UP (ref 3.5–5.3)
POTASSIUM SERPL-SCNC: 3.5 MMOL/L — SIGNIFICANT CHANGE UP (ref 3.5–5.3)
POTASSIUM SERPL-SCNC: 3.7 MMOL/L — SIGNIFICANT CHANGE UP (ref 3.5–5.3)
RBC # BLD: 4.48 M/UL — SIGNIFICANT CHANGE UP (ref 3.8–5.2)
RBC # FLD: 16.1 % — HIGH (ref 10.3–14.5)
S PYO DNA THROAT QL NAA+PROBE: DETECTED
SODIUM SERPL-SCNC: 135 MMOL/L — SIGNIFICANT CHANGE UP (ref 135–145)
SODIUM SERPL-SCNC: 136 MMOL/L — SIGNIFICANT CHANGE UP (ref 135–145)
WBC # BLD: 20.21 K/UL — HIGH (ref 3.8–10.5)
WBC # FLD AUTO: 20.21 K/UL — HIGH (ref 3.8–10.5)

## 2023-06-18 PROCEDURE — 99223 1ST HOSP IP/OBS HIGH 75: CPT

## 2023-06-18 RX ORDER — HEPARIN SODIUM 5000 [USP'U]/ML
5000 INJECTION INTRAVENOUS; SUBCUTANEOUS EVERY 12 HOURS
Refills: 0 | Status: DISCONTINUED | OUTPATIENT
Start: 2023-06-18 | End: 2023-06-20

## 2023-06-18 RX ORDER — BENZOCAINE AND MENTHOL 5; 1 G/100ML; G/100ML
1 LIQUID ORAL
Refills: 0 | Status: DISCONTINUED | OUTPATIENT
Start: 2023-06-18 | End: 2023-06-20

## 2023-06-18 RX ORDER — DEXTROSE 50 % IN WATER 50 %
15 SYRINGE (ML) INTRAVENOUS ONCE
Refills: 0 | Status: DISCONTINUED | OUTPATIENT
Start: 2023-06-18 | End: 2023-06-20

## 2023-06-18 RX ORDER — LEVOTHYROXINE SODIUM 125 MCG
100 TABLET ORAL DAILY
Refills: 0 | Status: DISCONTINUED | OUTPATIENT
Start: 2023-06-18 | End: 2023-06-20

## 2023-06-18 RX ORDER — INSULIN LISPRO 100/ML
2 VIAL (ML) SUBCUTANEOUS
Refills: 0 | Status: DISCONTINUED | OUTPATIENT
Start: 2023-06-18 | End: 2023-06-20

## 2023-06-18 RX ORDER — LUBIPROSTONE 24 UG/1
1 CAPSULE, GELATIN COATED ORAL
Refills: 0 | DISCHARGE

## 2023-06-18 RX ORDER — FLUTICASONE PROPIONATE 220 MCG
1 AEROSOL WITH ADAPTER (GRAM) INHALATION
Refills: 0 | DISCHARGE

## 2023-06-18 RX ORDER — LEVOTHYROXINE SODIUM 125 MCG
1 TABLET ORAL
Refills: 0 | DISCHARGE

## 2023-06-18 RX ORDER — SODIUM CHLORIDE 9 MG/ML
1000 INJECTION, SOLUTION INTRAVENOUS
Refills: 0 | Status: DISCONTINUED | OUTPATIENT
Start: 2023-06-18 | End: 2023-06-20

## 2023-06-18 RX ORDER — AMLODIPINE BESYLATE 2.5 MG/1
1 TABLET ORAL
Refills: 0 | DISCHARGE

## 2023-06-18 RX ORDER — OMEPRAZOLE 10 MG/1
1 CAPSULE, DELAYED RELEASE ORAL
Refills: 0 | DISCHARGE

## 2023-06-18 RX ORDER — ACETAMINOPHEN 500 MG
650 TABLET ORAL EVERY 6 HOURS
Refills: 0 | Status: DISCONTINUED | OUTPATIENT
Start: 2023-06-18 | End: 2023-06-20

## 2023-06-18 RX ORDER — CEFTRIAXONE 500 MG/1
1000 INJECTION, POWDER, FOR SOLUTION INTRAMUSCULAR; INTRAVENOUS EVERY 24 HOURS
Refills: 0 | Status: DISCONTINUED | OUTPATIENT
Start: 2023-06-18 | End: 2023-06-20

## 2023-06-18 RX ORDER — ALIROCUMAB 75 MG/ML
75 INJECTION, SOLUTION SUBCUTANEOUS
Refills: 0 | DISCHARGE

## 2023-06-18 RX ORDER — GLUCAGON INJECTION, SOLUTION 0.5 MG/.1ML
1 INJECTION, SOLUTION SUBCUTANEOUS ONCE
Refills: 0 | Status: DISCONTINUED | OUTPATIENT
Start: 2023-06-18 | End: 2023-06-20

## 2023-06-18 RX ORDER — SODIUM CHLORIDE 9 MG/ML
1000 INJECTION, SOLUTION INTRAVENOUS
Refills: 0 | Status: DISCONTINUED | OUTPATIENT
Start: 2023-06-18 | End: 2023-06-18

## 2023-06-18 RX ORDER — METOPROLOL TARTRATE 50 MG
1 TABLET ORAL
Refills: 0 | DISCHARGE

## 2023-06-18 RX ORDER — CEVIMELINE 30 MG/1
1 CAPSULE ORAL
Refills: 0 | DISCHARGE

## 2023-06-18 RX ORDER — GLYBURIDE 5 MG
1 TABLET ORAL
Refills: 0 | DISCHARGE

## 2023-06-18 RX ORDER — DEXTROSE 50 % IN WATER 50 %
25 SYRINGE (ML) INTRAVENOUS ONCE
Refills: 0 | Status: DISCONTINUED | OUTPATIENT
Start: 2023-06-18 | End: 2023-06-20

## 2023-06-18 RX ORDER — SODIUM CHLORIDE 9 MG/ML
1000 INJECTION INTRAMUSCULAR; INTRAVENOUS; SUBCUTANEOUS
Refills: 0 | Status: DISCONTINUED | OUTPATIENT
Start: 2023-06-18 | End: 2023-06-20

## 2023-06-18 RX ORDER — DULOXETINE HYDROCHLORIDE 30 MG/1
30 CAPSULE, DELAYED RELEASE ORAL DAILY
Refills: 0 | Status: DISCONTINUED | OUTPATIENT
Start: 2023-06-18 | End: 2023-06-20

## 2023-06-18 RX ORDER — INSULIN LISPRO 100/ML
VIAL (ML) SUBCUTANEOUS
Refills: 0 | Status: DISCONTINUED | OUTPATIENT
Start: 2023-06-18 | End: 2023-06-20

## 2023-06-18 RX ORDER — PANTOPRAZOLE SODIUM 20 MG/1
40 TABLET, DELAYED RELEASE ORAL
Refills: 0 | Status: DISCONTINUED | OUTPATIENT
Start: 2023-06-18 | End: 2023-06-20

## 2023-06-18 RX ORDER — DULOXETINE HYDROCHLORIDE 30 MG/1
1 CAPSULE, DELAYED RELEASE ORAL
Refills: 0 | DISCHARGE

## 2023-06-18 RX ORDER — LANOLIN ALCOHOL/MO/W.PET/CERES
3 CREAM (GRAM) TOPICAL AT BEDTIME
Refills: 0 | Status: DISCONTINUED | OUTPATIENT
Start: 2023-06-18 | End: 2023-06-20

## 2023-06-18 RX ORDER — METOPROLOL TARTRATE 50 MG
50 TABLET ORAL DAILY
Refills: 0 | Status: DISCONTINUED | OUTPATIENT
Start: 2023-06-18 | End: 2023-06-20

## 2023-06-18 RX ORDER — AMLODIPINE BESYLATE 2.5 MG/1
10 TABLET ORAL DAILY
Refills: 0 | Status: DISCONTINUED | OUTPATIENT
Start: 2023-06-18 | End: 2023-06-20

## 2023-06-18 RX ORDER — ONDANSETRON 8 MG/1
4 TABLET, FILM COATED ORAL EVERY 8 HOURS
Refills: 0 | Status: DISCONTINUED | OUTPATIENT
Start: 2023-06-18 | End: 2023-06-20

## 2023-06-18 RX ADMIN — DULOXETINE HYDROCHLORIDE 30 MILLIGRAM(S): 30 CAPSULE, DELAYED RELEASE ORAL at 18:27

## 2023-06-18 RX ADMIN — HEPARIN SODIUM 5000 UNIT(S): 5000 INJECTION INTRAVENOUS; SUBCUTANEOUS at 18:27

## 2023-06-18 RX ADMIN — ONDANSETRON 4 MILLIGRAM(S): 8 TABLET, FILM COATED ORAL at 08:33

## 2023-06-18 RX ADMIN — CEFTRIAXONE 1000 MILLIGRAM(S): 500 INJECTION, POWDER, FOR SOLUTION INTRAMUSCULAR; INTRAVENOUS at 06:50

## 2023-06-18 NOTE — H&P ADULT - TIME BILLING
Reviewing prior medical record, ED course, reviewing labs/imaging studies, discussing case with ED attending, examining patient, furnishing H&P, orders, doing medication reconciliation, discussing plan of care with Patient and answering all their questions.

## 2023-06-18 NOTE — CHART NOTE - NSCHARTNOTEFT_GEN_A_CORE
Pt is c/o sore throat,mild improvement compare to yesterday. denies chill,n/v/d/dysuria,cough/sob    T(C): 37.3 (06-18-23 @ 08:57), Max: 38.5 (06-17-23 @ 19:00)  HR: 86 (06-18-23 @ 11:44) (73 - 117)  BP: 130/78 (06-18-23 @ 11:44) (116/57 - 139/68)  RR: 18 (06-18-23 @ 11:44) (18 - 20)  SpO2: 95% (06-18-23 @ 11:44) (92% - 98%)    GEN - NAD  HEENT - NCAT, EOMI, KAYY, no JVD/bruit. erythema oropharynx ,central uvula,no pus seen  RESP - CTA BL, no wheeze/rhonchi/crackles.   CARDIO - NS1S2, RRR. No murmurs/rubs/gallops.  ABD - Soft/Non tender/Non distended. Normal BS x4 quadrants.   Ext - No JENNY.   MSK - full ROM of BL upper and lower extremities without pain or restriction. BL 5/5 strength on upper and lower extremities.   Neuro - cn 2-12 grossly intact. . no tremor. gait - stable,ambulating to bathroom  Psych- AAOx3. normal affect.  continue abx. spoke with ID.F/U CX'S. Diet as tolerate.  replaced k/mag. f/u cbc,bmp,mag  padmini rn

## 2023-06-18 NOTE — PATIENT PROFILE ADULT - FALL HARM RISK - UNIVERSAL INTERVENTIONS
Bed in lowest position, wheels locked, appropriate side rails in place/Call bell, personal items and telephone in reach/Instruct patient to call for assistance before getting out of bed or chair/Non-slip footwear when patient is out of bed/[place] to call system/Physically safe environment - no spills, clutter or unnecessary equipment/Purposeful Proactive Rounding/Room/bathroom lighting operational, light cord in reach

## 2023-06-18 NOTE — H&P ADULT - ASSESSMENT
73 y/o female with Sepsis from Pharyngitis, Hx of  SVT s/p ablation, DM-2, HTN, HLD, Depression, Hypothyroidism, CKD-3, IBS    Pharyngitis/Sepsis:  -Likely strep, will obtain throat culture  -Check monospot  -Cepacol lozenges   -blood cx sent  -No other focus of infection identified at this time  -Start on IV Abx pending above cx  -Lactate normal, MAP/SBP above goal  -trend WBC/Temp curve  -OOB, ambulate, fall risk  -DVT-P with vc boots/Heparin    SVT:  -s/p ablation   -Cont. Metoprolol     DM-2:  -Hold glyburide with CKD/poor po intake  -ADA diet  -Insulin SS, mealtime coverage    CKD-3:  -Avoid nephrotoxins  -Renally dose meds as indicated    HTN:  -Resume o/p regimen     HLD:  -On Biweekly Praluent injections due to statin intolerance    IBS:  -Resume Lubiprostone upon DC    Depression:  -Denies SI/HI  -Cont. o/p regimen    Hypothyroidism:  -Cont. Synthroid     Discussed with Patient, ED staff

## 2023-06-18 NOTE — CONSULT NOTE ADULT - SUBJECTIVE AND OBJECTIVE BOX
INFECTIOUS DISEASES AND INTERNAL MEDICINE at [facility]  =======================================================  Austin Joy MD  Diplomates American Board of Internal Medicine and Infectious Diseases  Telephone [phone]  Fax            [phone]  =======================================================    GRECIA [id number]      HPI:  71 y/o female with hx of DM-2, IBS, GERD, PVD s/p peripheral artery stents, HTN, CKD-3, SVT s/p ablation, Hypothyroidism, who presents from home c/o 1 week of generalized malaise, with subjective fevers. She denies SOB, CP, dysuria. No travel, but she admits to being around her Grandkids who have had URI symptoms. She also c/o throat pain for the past 3 days. No diarrhea, but has had a few episodes of NBNB emesis. Denies any changes in medications. In the ED noted to have fever of 101.3, tachycardia, leukocytosis. UA with 3-5WBCs, RVP neg, CXR w/o obvious infiltrate, CT abd/pelvis with no acute pathology. Patient had blood cx sent, given tylenol for fever.   PT WITH PHARYNGEAL ERYTHEMA AND  CERVICAL LN  ASKED TO EVALUATE FROM ID STANDPOINT         PAST MEDICAL & SURGICAL HISTORY:  GERD (gastroesophageal reflux disease)      Depression      Hypothyroid      Hypertension      Tachycardia  Not sure of specific name of heart irregularity      Diabetes      H/O small bowel obstruction  with 3 surgeries      Bilateral ovarian cysts  left oopherectomy      History of appendectomy          ANTIBIOTICS  cefTRIAXone Injectable. 1000 milliGRAM(s) IV Push every 24 hours      Allergies    codeine (Unknown)  latex (Unknown)  niacin (Unknown)    Intolerances        SOCIAL HISTORY:     FAMILY HX   FAMILY HISTORY:  Family history of myocardial infarction (Father, Mother)        Vital Signs Last 24 Hrs  T(C): 37.3 (2023 08:57), Max: 38.5 (2023 19:00)  T(F): 99.2 (2023 08:57), Max: 101.3 (2023 19:00)  HR: 86 (2023 11:44) (73 - 117)  BP: 130/78 (2023 11:44) (116/57 - 139/68)  BP(mean): 86 (2023 06:03) (86 - 86)  RR: 18 (2023 11:44) (18 - 20)  SpO2: 95% (:44) (92% - 98%)    Parameters below as of 2023 11:44  Patient On (Oxygen Delivery Method): room air      Drug Dosing Weight  Height (cm): 160 (2022 10:25)  Weight (kg): 57.6 (2023 19:00)  BMI (kg/m2): 22.5 (2023 19:00)  BSA (m2): 1.59 (2023 19:00)      REVIEW OF SYSTEMS:    CONSTITUTIONAL:  As per HPI.    HEENT:  Eyes:  No diplopia or blurred vision. ENT:   AS PER HPI    CARDIOVASCULAR:  No pressure, squeezing, strangling, tightness, heaviness or aching about the chest, neck, axilla or epigastrium.    RESPIRATORY:  No cough, shortness of breath, PND or orthopnea.    GASTROINTESTINAL:  No nausea, vomiting or diarrhea.    GENITOURINARY:  No dysuria, frequency or urgency.    MUSCULOSKELETAL:  As per HPI.    SKIN:  No change in skin, hair or nails.    NEUROLOGIC:  No paresthesias, fasciculations, seizures or weakness.                  PHYSICAL EXAMINATION:    GENERAL: The patient is a _____in no apparent distress. ___     VITAL SIGNS: T(C): 37.3 (23 @ 08:57), Max: 38.5 (23 @ 19:00)  HR: 86 (23 @ 11:44) (73 - 117)  BP: 130/78 (23 @ 11:44) (116/57 - 139/68)  RR: 18 (23 @ 11:44) (18 - 20)  SpO2: 95% (23 @ 11:44) (92% - 98%)  Wt(kg): --    HEENT: Head is normocephalic and atraumatic.  ANICTERIC PHARYNX WITH EDEMA AND ERYTHEMA   TENDE RENLARGED  CERVICAL LN   NECK: Supple. No carotid bruits.  No lymphadenopathy or thyromegaly.    LUNGS:COARSE BREATH SOUNDS    HEART: Regular rate and rhythm without murmur.    ABDOMEN: Soft, nontender, and nondistended.  Positive bowel sounds.  No hepatosplenomegaly was noted. NO REBOUND NO GUARDING    EXTREMITIES: NO EDEMA NO ERYTHEMA    NEUROLOGIC: NON FOCAL      SKIN: No ulceration or induration present. NO RASH        BLOOD CULTURES       URINE CX          LABS:                        13.1   20.21 )-----------( 254      ( 2023 08:30 )             41.1         135  |  96  |  14.0  ----------------------------<  109<H>  3.5   |  27.0  |  1.23    Ca    8.7      2023 09:45    TPro  7.2  /  Alb  3.7  /  TBili  0.7  /  DBili  x   /  AST  23  /  ALT  10  /  AlkPhos  102  06-17    PT/INR - ( 2023 20:07 )   PT: 14.5 sec;   INR: 1.25 ratio         PTT - ( 2023 20:07 )  PTT:29.5 sec  Urinalysis Basic - ( 2023 20:48 )    Color: Yellow / Appearance: Clear / S.005 / pH: x  Gluc: x / Ketone: Negative  / Bili: Negative / Urobili: Negative   Blood: x / Protein: 30 mg/dL / Nitrite: Positive   Leuk Esterase: Small / RBC: 6-10 /HPF / WBC 3-5 /HPF   Sq Epi: x / Non Sq Epi: x / Bacteria: Occasional        RADIOLOGY & ADDITIONAL STUDIES:      ASSESSMENT/PLAN  71 y/o female with hx of DM-2, IBS, GERD, PVD s/p peripheral artery stents, HTN, CKD-3, SVT s/p ablation, Hypothyroidism, who presents from home c/o 1 week of generalized malaise, with subjective fevers. She denies SOB, CP, dysuria. No travel, but she admits to being around her Grandkids who have had URI symptoms. She also c/o throat pain for the past 3 days. No diarrhea, but has had a few episodes of NBNB emesis. Denies any changes in medications. In the ED noted to have fever of 101.3, tachycardia, leukocytosis. UA with 3-5WBCs, RVP neg, CXR w/o obvious infiltrate, CT abd/pelvis with no acute pathology. Patient had blood cx sent, given tylenol for fever.   PT WITH PHARYNGEAL ERYTHEMA AND  CERVICAL LN   PT REPORTS SHE HAS NOT BEEN FEELING WELL FOR  A FEW MONTHS WITH ON AND OFF FEVERS AT THAT TIME  AND OCCASIONAL NIGHT SWEATS  THROAT CX SENT PLACED ON IV ABX   PT WTIH TENDER LN PAIN ON SWALLOWING  WOULD SUGGEST CT OF NECK TO EVALUATE BETTER  R/O DEEPER INFECTION  PT ON ROCEPHIN WILL CONTINUE FOR NOW   WILL FOLLOW UP WITH FURTHER RECOMMENDATIONS                DELMIS ADAM MD

## 2023-06-18 NOTE — ED ADULT NURSE REASSESSMENT NOTE - NS ED NURSE REASSESS COMMENT FT1
Report given to CDU RN . Pt aware of bed assignment. Pt currently comfortable with no complaints or requests at this time. VSS.

## 2023-06-18 NOTE — H&P ADULT - HISTORY OF PRESENT ILLNESS
71 y/o female with hx of DM-2, IBS, GERD, PVD s/p peripheral artery stents, HTN, CKD-3, SVT s/p ablation, Hypothyroidism, who presents from home c/o 1 week of generalized malaise, with subjective fevers. She denies SOB, CP, dysuria. No travel, but she admits to being around her Grandkids who have had URI symptoms. She also c/o throat pain for the past 3 days. No diarrhea, but has had a few episodes of NBNB emesis. Denies any changes in medications. In the ED noted to have fever of 101.3, tachycardia, leukocytosis. UA with 3-5WBCs, RVP neg, CXR w/o obvious infiltrate, CT abd/pelvis with no acute pathology. Patient had blood cx sent, given tylenol for fever. On my exam has cobblestoning and pharyngeal exudates with enlarged tonsils. IV abx ordered, as well as Mono spot and throat culture.

## 2023-06-19 ENCOUNTER — APPOINTMENT (OUTPATIENT)
Dept: ORTHOPEDIC SURGERY | Facility: CLINIC | Age: 73
End: 2023-06-19

## 2023-06-19 LAB
A1C WITH ESTIMATED AVERAGE GLUCOSE RESULT: 6.1 % — HIGH (ref 4–5.6)
ANION GAP SERPL CALC-SCNC: 12 MMOL/L — SIGNIFICANT CHANGE UP (ref 5–17)
BUN SERPL-MCNC: 11.8 MG/DL — SIGNIFICANT CHANGE UP (ref 8–20)
CALCIUM SERPL-MCNC: 8.4 MG/DL — SIGNIFICANT CHANGE UP (ref 8.4–10.5)
CHLORIDE SERPL-SCNC: 104 MMOL/L — SIGNIFICANT CHANGE UP (ref 96–108)
CO2 SERPL-SCNC: 26 MMOL/L — SIGNIFICANT CHANGE UP (ref 22–29)
CREAT SERPL-MCNC: 1.13 MG/DL — SIGNIFICANT CHANGE UP (ref 0.5–1.3)
CULTURE RESULTS: SIGNIFICANT CHANGE UP
EGFR: 52 ML/MIN/1.73M2 — LOW
ESTIMATED AVERAGE GLUCOSE: 128 MG/DL — HIGH (ref 68–114)
GLUCOSE BLDC GLUCOMTR-MCNC: 107 MG/DL — HIGH (ref 70–99)
GLUCOSE BLDC GLUCOMTR-MCNC: 121 MG/DL — HIGH (ref 70–99)
GLUCOSE BLDC GLUCOMTR-MCNC: 94 MG/DL — SIGNIFICANT CHANGE UP (ref 70–99)
GLUCOSE BLDC GLUCOMTR-MCNC: 94 MG/DL — SIGNIFICANT CHANGE UP (ref 70–99)
GLUCOSE SERPL-MCNC: 92 MG/DL — SIGNIFICANT CHANGE UP (ref 70–99)
HCT VFR BLD CALC: 36.7 % — SIGNIFICANT CHANGE UP (ref 34.5–45)
HCT VFR BLD CALC: 36.7 % — SIGNIFICANT CHANGE UP (ref 34.5–45)
HCV AB S/CO SERPL IA: 0.17 S/CO — SIGNIFICANT CHANGE UP (ref 0–0.99)
HCV AB SERPL-IMP: SIGNIFICANT CHANGE UP
HGB BLD-MCNC: 11.8 G/DL — SIGNIFICANT CHANGE UP (ref 11.5–15.5)
HGB BLD-MCNC: 11.9 G/DL — SIGNIFICANT CHANGE UP (ref 11.5–15.5)
MAGNESIUM SERPL-MCNC: 1.5 MG/DL — LOW (ref 1.6–2.6)
MAGNESIUM SERPL-MCNC: 2.1 MG/DL — SIGNIFICANT CHANGE UP (ref 1.6–2.6)
MCHC RBC-ENTMCNC: 29.8 PG — SIGNIFICANT CHANGE UP (ref 27–34)
MCHC RBC-ENTMCNC: 29.8 PG — SIGNIFICANT CHANGE UP (ref 27–34)
MCHC RBC-ENTMCNC: 32.2 GM/DL — SIGNIFICANT CHANGE UP (ref 32–36)
MCHC RBC-ENTMCNC: 32.4 GM/DL — SIGNIFICANT CHANGE UP (ref 32–36)
MCV RBC AUTO: 91.8 FL — SIGNIFICANT CHANGE UP (ref 80–100)
MCV RBC AUTO: 92.7 FL — SIGNIFICANT CHANGE UP (ref 80–100)
PHOSPHATE SERPL-MCNC: 3 MG/DL — SIGNIFICANT CHANGE UP (ref 2.4–4.7)
PLATELET # BLD AUTO: 319 K/UL — SIGNIFICANT CHANGE UP (ref 150–400)
PLATELET # BLD AUTO: 326 K/UL — SIGNIFICANT CHANGE UP (ref 150–400)
POTASSIUM SERPL-MCNC: 3.9 MMOL/L — SIGNIFICANT CHANGE UP (ref 3.5–5.3)
POTASSIUM SERPL-SCNC: 3.9 MMOL/L — SIGNIFICANT CHANGE UP (ref 3.5–5.3)
RBC # BLD: 3.96 M/UL — SIGNIFICANT CHANGE UP (ref 3.8–5.2)
RBC # BLD: 4 M/UL — SIGNIFICANT CHANGE UP (ref 3.8–5.2)
RBC # FLD: 15.8 % — HIGH (ref 10.3–14.5)
RBC # FLD: 15.8 % — HIGH (ref 10.3–14.5)
SODIUM SERPL-SCNC: 142 MMOL/L — SIGNIFICANT CHANGE UP (ref 135–145)
SPECIMEN SOURCE: SIGNIFICANT CHANGE UP
WBC # BLD: 10.45 K/UL — SIGNIFICANT CHANGE UP (ref 3.8–10.5)
WBC # BLD: 9.35 K/UL — SIGNIFICANT CHANGE UP (ref 3.8–10.5)
WBC # FLD AUTO: 10.45 K/UL — SIGNIFICANT CHANGE UP (ref 3.8–10.5)
WBC # FLD AUTO: 9.35 K/UL — SIGNIFICANT CHANGE UP (ref 3.8–10.5)

## 2023-06-19 PROCEDURE — 70491 CT SOFT TISSUE NECK W/DYE: CPT | Mod: 26

## 2023-06-19 PROCEDURE — 99232 SBSQ HOSP IP/OBS MODERATE 35: CPT

## 2023-06-19 PROCEDURE — 99233 SBSQ HOSP IP/OBS HIGH 50: CPT

## 2023-06-19 RX ORDER — MAGNESIUM SULFATE 500 MG/ML
1 VIAL (ML) INJECTION ONCE
Refills: 0 | Status: COMPLETED | OUTPATIENT
Start: 2023-06-19 | End: 2023-06-19

## 2023-06-19 RX ORDER — MAGNESIUM OXIDE 400 MG ORAL TABLET 241.3 MG
400 TABLET ORAL
Refills: 0 | Status: DISCONTINUED | OUTPATIENT
Start: 2023-06-19 | End: 2023-06-20

## 2023-06-19 RX ADMIN — MAGNESIUM OXIDE 400 MG ORAL TABLET 400 MILLIGRAM(S): 241.3 TABLET ORAL at 08:43

## 2023-06-19 RX ADMIN — MAGNESIUM OXIDE 400 MG ORAL TABLET 400 MILLIGRAM(S): 241.3 TABLET ORAL at 17:22

## 2023-06-19 RX ADMIN — PANTOPRAZOLE SODIUM 40 MILLIGRAM(S): 20 TABLET, DELAYED RELEASE ORAL at 05:18

## 2023-06-19 RX ADMIN — MAGNESIUM OXIDE 400 MG ORAL TABLET 400 MILLIGRAM(S): 241.3 TABLET ORAL at 13:34

## 2023-06-19 RX ADMIN — HEPARIN SODIUM 5000 UNIT(S): 5000 INJECTION INTRAVENOUS; SUBCUTANEOUS at 17:22

## 2023-06-19 RX ADMIN — SODIUM CHLORIDE 100 MILLILITER(S): 9 INJECTION INTRAMUSCULAR; INTRAVENOUS; SUBCUTANEOUS at 05:21

## 2023-06-19 RX ADMIN — Medication 50 MILLIGRAM(S): at 05:19

## 2023-06-19 RX ADMIN — DULOXETINE HYDROCHLORIDE 30 MILLIGRAM(S): 30 CAPSULE, DELAYED RELEASE ORAL at 13:34

## 2023-06-19 RX ADMIN — SODIUM CHLORIDE 100 MILLILITER(S): 9 INJECTION INTRAMUSCULAR; INTRAVENOUS; SUBCUTANEOUS at 08:47

## 2023-06-19 RX ADMIN — Medication 100 MICROGRAM(S): at 05:18

## 2023-06-19 RX ADMIN — CEFTRIAXONE 1000 MILLIGRAM(S): 500 INJECTION, POWDER, FOR SOLUTION INTRAMUSCULAR; INTRAVENOUS at 05:19

## 2023-06-19 RX ADMIN — HEPARIN SODIUM 5000 UNIT(S): 5000 INJECTION INTRAVENOUS; SUBCUTANEOUS at 05:19

## 2023-06-19 RX ADMIN — Medication 100 GRAM(S): at 08:43

## 2023-06-19 RX ADMIN — AMLODIPINE BESYLATE 10 MILLIGRAM(S): 2.5 TABLET ORAL at 05:18

## 2023-06-19 NOTE — PROGRESS NOTE ADULT - ASSESSMENT
71 y/o female with Sepsis from Pharyngitis, Hx of  SVT s/p ablation, DM-2, HTN, HLD, Depression, Hypothyroidism, CKD-3, IBS    Pharyngitis/Sepsis:  - throat culture strep,f/u final c/s   -Check monospot  -blood cx sent  -Ct neck   - on IV Abx rocephine  -Lactate normal, MAP/SBP above goal  -trend WBC/Temp curve  -OOB, ambulate, fall risk  -f/u id rec    Hypomag 1.5  -replaced.recheck 11 am      SVT:  -s/p ablation   -Cont. Metoprolol     DM-2:  -Hold glyburide with CKD/poor po intake  -ADA diet  -Insulin SS, mealtime coverage    CKD-3:  -Avoid nephrotoxins  -Renally dose meds as indicated    HTN:  -Resume o/p regimen     HLD:  -On Biweekly Praluent injections due to statin intolerance    IBS:  -Resume Lubiprostone upon DC    Depression:  -Denies SI/HI  -Cont. o/p regimen    Hypothyroidism:  -Cont. Synthroid     DVT-P with vc boots/Heparin 73 y/o female with Sepsis from Pharyngitis, Hx of  SVT s/p ablation, DM-2, HTN, HLD, Depression, Hypothyroidism, CKD-3, IBS    Pharyngitis/Sepsis:  - throat culture strep,f/u final c/s   -Check monospot  -blood cx sent  -Ct neck   -resume diet  -on IV Abx rocephine  -Lactate normal, MAP/SBP above goal  -trend WBC/Temp curve  -OOB, ambulate, fall risk  -f/u id rec    Hypomag 1.5  -replaced.recheck 11 am      SVT:  -s/p ablation   -Cont. Metoprolol     DM-2:  -Hold glyburide with CKD/poor po intake  -ADA diet  -Insulin SS, mealtime coverage    CKD-3:  -Avoid nephrotoxins  -Renally dose meds as indicated    HTN:  -Resume o/p regimen     HLD:  -On Biweekly Praluent injections due to statin intolerance    IBS:  -Resume Lubiprostone upon DC    Depression:  -Denies SI/HI  -Cont. o/p regimen    Hypothyroidism:  -Cont. Synthroid     DVT-P with vc boots/Heparin    plan of care dw pt 73 y/o female with Sepsis from Pharyngitis, Hx of  SVT s/p ablation, DM-2, HTN, HLD, Depression, Hypothyroidism, CKD-3, IBS    Pharyngitis/Sepsis:  - throat culture strep,f/u final c/s   -Check monospot  -blood cx sent  -Ct neck   -resume diet  -on IV Abx rocephine  -Lactate normal, MAP/SBP above goal  -abn ua,c/s contamination.pt does not have any urinary symptoms.  -trend WBC/Temp curve  -OOB, ambulate, fall risk  -f/u id rec    Hypomag 1.5  -replaced.recheck 11 am      SVT:  -s/p ablation   -Cont. Metoprolol     DM-2:  -Hold glyburide with CKD/poor po intake  -ADA diet  -Insulin SS, mealtime coverage    CKD-3:  -Avoid nephrotoxins  -Renally dose meds as indicated    HTN:  -Resume o/p regimen     HLD:  -On Biweekly Praluent injections due to statin intolerance    IBS:  -Resume Lubiprostone upon DC    Depression:  -Denies SI/HI  -Cont. o/p regimen    Hypothyroidism:  -Cont. Synthroid     DVT-P with vc boots/Heparin    plan of care dw pt

## 2023-06-19 NOTE — SWALLOW BEDSIDE ASSESSMENT ADULT - SLP PERTINENT HISTORY OF CURRENT PROBLEM
As per H&P: "71 y/o female with hx of DM-2, IBS, GERD, PVD s/p peripheral artery stents, HTN, CKD-3, SVT s/p ablation, Hypothyroidism, who presents from home c/o 1 week of generalized malaise, with subjective fevers. She denies SOB, CP, dysuria. No travel, but she admits to being around her Grandkids who have had URI symptoms. She also c/o throat pain for the past 3 days. No diarrhea, but has had a few episodes of NBNB emesis. Denies any changes in medications. In the ED noted to have fever of 101.3, tachycardia, leukocytosis. UA with 3-5WBCs, RVP neg, CXR w/o obvious infiltrate, CT abd/pelvis with no acute pathology. Patient had blood cx sent, given tylenol for fever. On my exam has cobblestoning and pharyngeal exudates with enlarged tonsils. IV abx ordered, as well as Dare spot and throat culture."

## 2023-06-19 NOTE — SWALLOW BEDSIDE ASSESSMENT ADULT - SWALLOW EVAL: DIAGNOSIS
Harinder-pharyngeal swallow clinically assessed to be WFL w/ no overt s/s of penetration/aspiration observed across consistencies. Pt w/ multiple swallows across all consistencies, pt reports needing to "swallow only a little at a time" 2' odynophagia

## 2023-06-19 NOTE — PROGRESS NOTE ADULT - ASSESSMENT
73 y/o female with hx of DM-2, IBS, GERD, PVD s/p peripheral artery stents, HTN, CKD-3, SVT s/p ablation, Hypothyroidism, who presents from home c/o 1 week of generalized malaise, with subjective fevers. She denies SOB, CP, dysuria. No travel, but she admits to being around her Grandkids who have had URI symptoms. She also c/o throat pain for the past 3 days. No diarrhea, but has had a few episodes of NBNB emesis. Denies any changes in medications. In the ED noted to have fever of 101.3, tachycardia, leukocytosis. UA with 3-5WBCs, RVP neg, CXR w/o obvious infiltrate, CT abd/pelvis with no acute pathology. Patient had blood cx sent, given tylenol for fever.   PT WITH PHARYNGEAL ERYTHEMA AND  CERVICAL LN   PT REPORTS SHE HAS NOT BEEN FEELING WELL FOR  A FEW MONTHS WITH ON AND OFF FEVERS AT THAT TIME  AND OCCASIONAL NIGHT SWEATS  THROAT CX SENT PLACED ON IV ABX   THROAT CX STREP GROUP A PCR POS  PT WTIH TENDER LN PAIN ON SWALLOWING   CT OF NECK TO EVALUATE BETTER  R/O DEEPER INFECTION IS PENDING   PT ON ROCEPHIN WILL CONTINUE FOR NOW  OVERALL IMPROVED  IF CT SCAN NOT REVEALING POSSIBLE D/C  SOON   WILL FOLLOW UP WITH FURTHER RECOMMENDATIONS

## 2023-06-20 ENCOUNTER — TRANSCRIPTION ENCOUNTER (OUTPATIENT)
Age: 73
End: 2023-06-20

## 2023-06-20 VITALS
OXYGEN SATURATION: 96 % | HEART RATE: 78 BPM | RESPIRATION RATE: 16 BRPM | DIASTOLIC BLOOD PRESSURE: 71 MMHG | SYSTOLIC BLOOD PRESSURE: 129 MMHG | TEMPERATURE: 98 F

## 2023-06-20 LAB
ANION GAP SERPL CALC-SCNC: 12 MMOL/L — SIGNIFICANT CHANGE UP (ref 5–17)
BUN SERPL-MCNC: 14.7 MG/DL — SIGNIFICANT CHANGE UP (ref 8–20)
CALCIUM SERPL-MCNC: 8.6 MG/DL — SIGNIFICANT CHANGE UP (ref 8.4–10.5)
CHLORIDE SERPL-SCNC: 105 MMOL/L — SIGNIFICANT CHANGE UP (ref 96–108)
CO2 SERPL-SCNC: 27 MMOL/L — SIGNIFICANT CHANGE UP (ref 22–29)
CREAT SERPL-MCNC: 1.2 MG/DL — SIGNIFICANT CHANGE UP (ref 0.5–1.3)
CULTURE RESULTS: SIGNIFICANT CHANGE UP
EGFR: 48 ML/MIN/1.73M2 — LOW
GLUCOSE BLDC GLUCOMTR-MCNC: 108 MG/DL — HIGH (ref 70–99)
GLUCOSE BLDC GLUCOMTR-MCNC: 87 MG/DL — SIGNIFICANT CHANGE UP (ref 70–99)
GLUCOSE SERPL-MCNC: 100 MG/DL — HIGH (ref 70–99)
HCT VFR BLD CALC: 34.9 % — SIGNIFICANT CHANGE UP (ref 34.5–45)
HGB BLD-MCNC: 11.2 G/DL — LOW (ref 11.5–15.5)
MAGNESIUM SERPL-MCNC: 1.9 MG/DL — SIGNIFICANT CHANGE UP (ref 1.6–2.6)
MCHC RBC-ENTMCNC: 29.3 PG — SIGNIFICANT CHANGE UP (ref 27–34)
MCHC RBC-ENTMCNC: 32.1 GM/DL — SIGNIFICANT CHANGE UP (ref 32–36)
MCV RBC AUTO: 91.4 FL — SIGNIFICANT CHANGE UP (ref 80–100)
PLATELET # BLD AUTO: 368 K/UL — SIGNIFICANT CHANGE UP (ref 150–400)
POTASSIUM SERPL-MCNC: 3.7 MMOL/L — SIGNIFICANT CHANGE UP (ref 3.5–5.3)
POTASSIUM SERPL-SCNC: 3.7 MMOL/L — SIGNIFICANT CHANGE UP (ref 3.5–5.3)
RBC # BLD: 3.82 M/UL — SIGNIFICANT CHANGE UP (ref 3.8–5.2)
RBC # FLD: 15.6 % — HIGH (ref 10.3–14.5)
SODIUM SERPL-SCNC: 143 MMOL/L — SIGNIFICANT CHANGE UP (ref 135–145)
SPECIMEN SOURCE: SIGNIFICANT CHANGE UP
WBC # BLD: 7.12 K/UL — SIGNIFICANT CHANGE UP (ref 3.8–10.5)
WBC # FLD AUTO: 7.12 K/UL — SIGNIFICANT CHANGE UP (ref 3.8–10.5)

## 2023-06-20 PROCEDURE — 85025 COMPLETE CBC W/AUTO DIFF WBC: CPT

## 2023-06-20 PROCEDURE — 83735 ASSAY OF MAGNESIUM: CPT

## 2023-06-20 PROCEDURE — 80048 BASIC METABOLIC PNL TOTAL CA: CPT

## 2023-06-20 PROCEDURE — 85730 THROMBOPLASTIN TIME PARTIAL: CPT

## 2023-06-20 PROCEDURE — 71045 X-RAY EXAM CHEST 1 VIEW: CPT

## 2023-06-20 PROCEDURE — 99232 SBSQ HOSP IP/OBS MODERATE 35: CPT

## 2023-06-20 PROCEDURE — 0225U NFCT DS DNA&RNA 21 SARSCOV2: CPT

## 2023-06-20 PROCEDURE — 87040 BLOOD CULTURE FOR BACTERIA: CPT

## 2023-06-20 PROCEDURE — 85610 PROTHROMBIN TIME: CPT

## 2023-06-20 PROCEDURE — 85027 COMPLETE CBC AUTOMATED: CPT

## 2023-06-20 PROCEDURE — 74177 CT ABD & PELVIS W/CONTRAST: CPT | Mod: MA

## 2023-06-20 PROCEDURE — 96374 THER/PROPH/DIAG INJ IV PUSH: CPT

## 2023-06-20 PROCEDURE — 87081 CULTURE SCREEN ONLY: CPT

## 2023-06-20 PROCEDURE — 99239 HOSP IP/OBS DSCHRG MGMT >30: CPT

## 2023-06-20 PROCEDURE — 87798 DETECT AGENT NOS DNA AMP: CPT

## 2023-06-20 PROCEDURE — 83690 ASSAY OF LIPASE: CPT

## 2023-06-20 PROCEDURE — 99285 EMERGENCY DEPT VISIT HI MDM: CPT

## 2023-06-20 PROCEDURE — 86803 HEPATITIS C AB TEST: CPT

## 2023-06-20 PROCEDURE — 81001 URINALYSIS AUTO W/SCOPE: CPT

## 2023-06-20 PROCEDURE — 93005 ELECTROCARDIOGRAM TRACING: CPT

## 2023-06-20 PROCEDURE — 83605 ASSAY OF LACTIC ACID: CPT

## 2023-06-20 PROCEDURE — 84100 ASSAY OF PHOSPHORUS: CPT

## 2023-06-20 PROCEDURE — 80053 COMPREHEN METABOLIC PANEL: CPT

## 2023-06-20 PROCEDURE — 92610 EVALUATE SWALLOWING FUNCTION: CPT

## 2023-06-20 PROCEDURE — 86308 HETEROPHILE ANTIBODY SCREEN: CPT

## 2023-06-20 PROCEDURE — 83036 HEMOGLOBIN GLYCOSYLATED A1C: CPT

## 2023-06-20 PROCEDURE — 87086 URINE CULTURE/COLONY COUNT: CPT

## 2023-06-20 PROCEDURE — 82962 GLUCOSE BLOOD TEST: CPT

## 2023-06-20 PROCEDURE — 70491 CT SOFT TISSUE NECK W/DYE: CPT

## 2023-06-20 PROCEDURE — 87651 STREP A DNA AMP PROBE: CPT

## 2023-06-20 PROCEDURE — 36415 COLL VENOUS BLD VENIPUNCTURE: CPT

## 2023-06-20 RX ORDER — ACETAMINOPHEN 500 MG
2 TABLET ORAL
Qty: 0 | Refills: 0 | DISCHARGE
Start: 2023-06-20

## 2023-06-20 RX ADMIN — MAGNESIUM OXIDE 400 MG ORAL TABLET 400 MILLIGRAM(S): 241.3 TABLET ORAL at 12:40

## 2023-06-20 RX ADMIN — CEFTRIAXONE 1000 MILLIGRAM(S): 500 INJECTION, POWDER, FOR SOLUTION INTRAMUSCULAR; INTRAVENOUS at 05:11

## 2023-06-20 RX ADMIN — SODIUM CHLORIDE 100 MILLILITER(S): 9 INJECTION INTRAMUSCULAR; INTRAVENOUS; SUBCUTANEOUS at 05:30

## 2023-06-20 RX ADMIN — DULOXETINE HYDROCHLORIDE 30 MILLIGRAM(S): 30 CAPSULE, DELAYED RELEASE ORAL at 12:40

## 2023-06-20 RX ADMIN — HEPARIN SODIUM 5000 UNIT(S): 5000 INJECTION INTRAVENOUS; SUBCUTANEOUS at 05:12

## 2023-06-20 RX ADMIN — MAGNESIUM OXIDE 400 MG ORAL TABLET 400 MILLIGRAM(S): 241.3 TABLET ORAL at 08:26

## 2023-06-20 RX ADMIN — PANTOPRAZOLE SODIUM 40 MILLIGRAM(S): 20 TABLET, DELAYED RELEASE ORAL at 05:12

## 2023-06-20 RX ADMIN — Medication 100 MICROGRAM(S): at 05:12

## 2023-06-20 NOTE — DISCHARGE NOTE PROVIDER - NSDCCPCAREPLAN_GEN_ALL_CORE_FT
PRINCIPAL DISCHARGE DIAGNOSIS  Diagnosis: Strep throat  Assessment and Plan of Treatment: finish course of augmentin  follow up with infectious disease if needed  follow up with your primary care doctor

## 2023-06-20 NOTE — DISCHARGE NOTE PROVIDER - HOSPITAL COURSE
73 y/o female with Sepsis from Pharyngitis, Hx of  SVT s/p ablation, DM-2, HTN, HLD, Depression, Hypothyroidism, CKD-3, IBS  +strep throat.  improved with rocephin. CT neck without abscess.  cultures negative   cleared for dc by ID on augmentin to complete 10 day course.

## 2023-06-20 NOTE — DISCHARGE NOTE PROVIDER - ATTENDING DISCHARGE PHYSICAL EXAMINATION:
[id number]  nad   rrr s1s2  ctab  soft abodmen  no LE edema  nonfocal neuro  tolerating diet  mild neck pain.

## 2023-06-20 NOTE — DISCHARGE NOTE PROVIDER - CARE PROVIDER_API CALL
Preston Phan  Infectious Disease  [address] [place] [address], [place] [address]  Phone: [phone]  Fax: [phone]  Follow Up Time:

## 2023-06-20 NOTE — PROGRESS NOTE ADULT - ASSESSMENT
73 y/o female with hx of DM-2, IBS, GERD, PVD s/p peripheral artery stents, HTN, CKD-3, SVT s/p ablation, Hypothyroidism, who presents from home c/o 1 week of generalized malaise, with subjective fevers. She denies SOB, CP, dysuria. No travel, but she admits to being around her Grandkids who have had URI symptoms. She also c/o throat pain for the past 3 days. No diarrhea, but has had a few episodes of NBNB emesis. Denies any changes in medications. In the ED noted to have fever of 101.3, tachycardia, leukocytosis. UA with 3-5WBCs, RVP neg, CXR w/o obvious infiltrate, CT abd/pelvis with no acute pathology. Patient had blood cx sent, given tylenol for fever.   PT WITH PHARYNGEAL ERYTHEMA AND  CERVICAL LN   PT REPORTS SHE HAS NOT BEEN FEELING WELL FOR  A FEW MONTHS WITH ON AND OFF FEVERS AT THAT TIME  AND OCCASIONAL NIGHT SWEATS  THROAT CX SENT PLACED ON IV ABX   THROAT CX STREP GROUP A PCR POS  PT WTIH TENDER LN PAIN ON SWALLOWING  WHICH HAS RESOLVED    CT OF NECK T TONSILLAR EDEMA NO ABSCESS  PT ON ROCEPHIN    OVERALL IMPROVED  OK TO D/C HOME ON AUGMENTIN SPOKE TO HOSPITALIST  WILL FOLLOW UP  AS NEEDED PLEASE CALL IF QUESTIONS

## 2023-06-20 NOTE — PROGRESS NOTE ADULT - SUBJECTIVE AND OBJECTIVE BOX
INFECTIOUS DISEASES AND INTERNAL MEDICINE at [place]  =======================================================  Austin Joy MD  Diplomates American Board of Internal Medicine and Infectious Diseases  Telephone [phone]  Fax            [phone]  =======================================================    GRECIA MAYORGA [id number]    Follow up: STREP     Allergies:  codeine (Unknown)  latex (Unknown)  niacin (Unknown)      Medications:  acetaminophen     Tablet .. 650 milliGRAM(s) Oral every 6 hours PRN  aluminum hydroxide/magnesium hydroxide/simethicone Suspension 30 milliLiter(s) Oral every 4 hours PRN  amLODIPine   Tablet 10 milliGRAM(s) Oral daily  benzocaine/menthol Lozenge 1 Lozenge Oral four times a day PRN  busPIRone 15 milliGRAM(s) Oral two times a day PRN  cefTRIAXone Injectable. 1000 milliGRAM(s) IV Push every 24 hours  dextrose 5%. 1000 milliLiter(s) IV Continuous <Continuous>  dextrose 50% Injectable 25 Gram(s) IV Push once  dextrose Oral Gel 15 Gram(s) Oral once PRN  DULoxetine 30 milliGRAM(s) Oral daily  glucagon  Injectable 1 milliGRAM(s) IntraMuscular once  heparin   Injectable 5000 Unit(s) SubCutaneous every 12 hours  insulin lispro (ADMELOG) corrective regimen sliding scale   SubCutaneous Before meals and at bedtime  insulin lispro Injectable (ADMELOG) 2 Unit(s) SubCutaneous before lunch  insulin lispro Injectable (ADMELOG) 2 Unit(s) SubCutaneous before dinner  insulin lispro Injectable (ADMELOG) 2 Unit(s) SubCutaneous before breakfast  levothyroxine 100 MICROGram(s) Oral daily  magnesium oxide 400 milliGRAM(s) Oral three times a day with meals  melatonin 3 milliGRAM(s) Oral at bedtime PRN  metoprolol succinate ER 50 milliGRAM(s) Oral daily  ondansetron Injectable 4 milliGRAM(s) IV Push every 8 hours PRN  pantoprazole    Tablet 40 milliGRAM(s) Oral before breakfast  sodium chloride 0.9%. 1000 milliLiter(s) IV Continuous <Continuous>    SOCIAL       FAMILY   FAMILY HISTORY:  Family history of myocardial infarction (Father, Mother)      REVIEW OF SYSTEMS:  CONSTITUTIONAL:  No Fever or chills  HEENT:   No diplopia or blurred vision.   , sore throat .  CARDIOVASCULAR:  No pressure, squeezing, strangling, tightness, heaviness or aching about the chest, neck, axilla or epigastrium.  RESPIRATORY:  No cough, shortness of breath, PND or orthopnea.  GASTROINTESTINAL:  No nausea, vomiting or diarrhea.  GENITOURINARY:  No dysuria, frequency or urgency. No Blood in urine  MUSCULOSKELETAL:   moves all joints  SKIN:  No change in skin, hair or nails.  NEUROLOGIC:  No paresthesias, fasciculations, seizures or weakness.  PSYCHIATRIC:  No disorder of thought or mood.  ENDOCRINE:  No heat or cold intolerance, polyuria or polydipsia.  HEMATOLOGICAL:  No easy bruising or bleeding.            Physical Exam:   Vital Signs Last 24 Hrs  T(C): 36.9 (20 Jun 2023 09:42), Max: 36.9 (20 Jun 2023 09:42)  T(F): 98.5 (20 Jun 2023 09:42), Max: 98.5 (20 Jun 2023 09:42)  HR: 64 (20 Jun 2023 09:42) (62 - 67)  BP: 122/62 (20 Jun 2023 09:42) (109/63 - 122/62)  BP(mean): --  RR: 18 (20 Jun 2023 09:42) (18 - 18)  SpO2: 96% (20 Jun 2023 09:42) (93% - 96%)    Parameters below as of 20 Jun 2023 09:42  Patient On (Oxygen Delivery Method): room air            GEN: NAD,   HEENT: normocephalic and atraumatic. EOMI. ALFREDO.    NECK: Supple. No carotid bruits.  No lymphadenopathy or thyromegaly .TENDRE CERVICAL LN   LUNGS: Clear to auscultation.  HEART: Regular rate and rhythm without murmur.  ABDOMEN: Soft, nontender, and nondistended.  Positive bowel sounds.    : No CVA tenderness  EXTREMITIES: Without any cyanosis, clubbing, rash, lesions or edema.  MSK: no joint swelling  NEUROLOGIC: Cranial nerves II through XII are grossly intact.  PSYCHIATRIC: Appropriate affect .  SKIN: No ulceration or induration present.        Labs:  Vitals:  ============     =======================================================  Current Antibiotics:  cefTRIAXone Injectable. 1000 milliGRAM(s) IV Push every 24 hours    Other medications:  amLODIPine   Tablet 10 milliGRAM(s) Oral daily  dextrose 5%. 1000 milliLiter(s) IV Continuous <Continuous>  dextrose 50% Injectable 25 Gram(s) IV Push once  DULoxetine 30 milliGRAM(s) Oral daily  glucagon  Injectable 1 milliGRAM(s) IntraMuscular once  heparin   Injectable 5000 Unit(s) SubCutaneous every 12 hours  insulin lispro (ADMELOG) corrective regimen sliding scale   SubCutaneous Before meals and at bedtime  insulin lispro Injectable (ADMELOG) 2 Unit(s) SubCutaneous before lunch  insulin lispro Injectable (ADMELOG) 2 Unit(s) SubCutaneous before dinner  insulin lispro Injectable (ADMELOG) 2 Unit(s) SubCutaneous before breakfast  levothyroxine 100 MICROGram(s) Oral daily  magnesium oxide 400 milliGRAM(s) Oral three times a day with meals  metoprolol succinate ER 50 milliGRAM(s) Oral daily  pantoprazole    Tablet 40 milliGRAM(s) Oral before breakfast  sodium chloride 0.9%. 1000 milliLiter(s) IV Continuous <Continuous>      =======================================================  Labs:                                        11.2   7.12  )-----------( 368      ( 20 Jun 2023 05:47 )             34.9   06-20    143  |  105  |  14.7  ----------------------------<  100<H>  3.7   |  27.0  |  1.20    Ca    8.6      20 Jun 2023 05:47  Phos  3.0     06-19  Mg     1.9     06-20        Culture - Urine (collected 06-17-23 @ 20:48)  Source: Clean Catch Clean Catch (Midstream)  Final Report (06-19-23 @ 07:32):    <10,000 CFU/mL Normal Urogenital Constance    Culture - Blood (collected 06-17-23 @ 19:35)  Source: .Blood Blood-Peripheral    Culture - Blood (collected 06-17-23 @ 19:30)  Source: .Blood Blood-Peripheral      Creatinine, Serum: 1.13 mg/dL (06-19-23 @ 06:19)  Creatinine, Serum: 1.19 mg/dL (06-18-23 @ 11:24)  Creatinine, Serum: 1.23 mg/dL (06-18-23 @ 09:45)  Creatinine, Serum: 1.55 mg/dL (06-17-23 @ 20:07)            WBC Count: 9.35 K/uL (06-19-23 @ 08:48)  WBC Count: 10.45 K/uL (06-19-23 @ 06:19)  WBC Count: 20.21 K/uL (06-18-23 @ 08:30)  WBC Count: 22.47 K/uL (06-17-23 @ 20:07)    SARS-CoV-2: NotDetec (06-17-23 @ 20:07)      Alkaline Phosphatase, Serum: 102 U/L (06-17-23 @ 20:07)  Alanine Aminotransferase (ALT/SGPT): 10 U/L (06-17-23 @ 20:07)  Aspartate Aminotransferase (AST/SGOT): 23 U/L (06-17-23 @ 20:07)  Bilirubin Total, Serum: 0.7 mg/dL (06-17-23 @ 20:07)  
INFECTIOUS DISEASES AND INTERNAL MEDICINE at [place]  =======================================================  Austin Joy MD  Diplomates American Board of Internal Medicine and Infectious Diseases  Telephone [phone]  Fax            [phone]  =======================================================    GRECIA MAYORGA [id number]    Follow up: STREP     Allergies:  codeine (Unknown)  latex (Unknown)  niacin (Unknown)      Medications:  acetaminophen     Tablet .. 650 milliGRAM(s) Oral every 6 hours PRN  aluminum hydroxide/magnesium hydroxide/simethicone Suspension 30 milliLiter(s) Oral every 4 hours PRN  amLODIPine   Tablet 10 milliGRAM(s) Oral daily  benzocaine/menthol Lozenge 1 Lozenge Oral four times a day PRN  busPIRone 15 milliGRAM(s) Oral two times a day PRN  cefTRIAXone Injectable. 1000 milliGRAM(s) IV Push every 24 hours  dextrose 5%. 1000 milliLiter(s) IV Continuous <Continuous>  dextrose 50% Injectable 25 Gram(s) IV Push once  dextrose Oral Gel 15 Gram(s) Oral once PRN  DULoxetine 30 milliGRAM(s) Oral daily  glucagon  Injectable 1 milliGRAM(s) IntraMuscular once  heparin   Injectable 5000 Unit(s) SubCutaneous every 12 hours  insulin lispro (ADMELOG) corrective regimen sliding scale   SubCutaneous Before meals and at bedtime  insulin lispro Injectable (ADMELOG) 2 Unit(s) SubCutaneous before lunch  insulin lispro Injectable (ADMELOG) 2 Unit(s) SubCutaneous before dinner  insulin lispro Injectable (ADMELOG) 2 Unit(s) SubCutaneous before breakfast  levothyroxine 100 MICROGram(s) Oral daily  magnesium oxide 400 milliGRAM(s) Oral three times a day with meals  melatonin 3 milliGRAM(s) Oral at bedtime PRN  metoprolol succinate ER 50 milliGRAM(s) Oral daily  ondansetron Injectable 4 milliGRAM(s) IV Push every 8 hours PRN  pantoprazole    Tablet 40 milliGRAM(s) Oral before breakfast  sodium chloride 0.9%. 1000 milliLiter(s) IV Continuous <Continuous>    SOCIAL       FAMILY   FAMILY HISTORY:  Family history of myocardial infarction (Father, Mother)      REVIEW OF SYSTEMS:  CONSTITUTIONAL:  No Fever or chills  HEENT:   No diplopia or blurred vision.  No earache, sore throat or runny nose.  CARDIOVASCULAR:  No pressure, squeezing, strangling, tightness, heaviness or aching about the chest, neck, axilla or epigastrium.  RESPIRATORY:  No cough, shortness of breath, PND or orthopnea.  GASTROINTESTINAL:  No nausea, vomiting or diarrhea.  GENITOURINARY:  No dysuria, frequency or urgency. No Blood in urine  MUSCULOSKELETAL:   moves all joints  SKIN:  No change in skin, hair or nails.  NEUROLOGIC:  No paresthesias, fasciculations, seizures or weakness.  PSYCHIATRIC:  No disorder of thought or mood.  ENDOCRINE:  No heat or cold intolerance, polyuria or polydipsia.  HEMATOLOGICAL:  No easy bruising or bleeding.            Physical Exam:  ICU Vital Signs Last 24 Hrs  T(C): 36.6 (19 Jun 2023 11:10), Max: 37.2 (18 Jun 2023 20:08)  T(F): 97.8 (19 Jun 2023 11:10), Max: 99 (18 Jun 2023 20:08)  HR: 85 (19 Jun 2023 11:10) (78 - 86)  BP: 112/71 (19 Jun 2023 11:10) (112/71 - 115/67)  BP(mean): 83 (18 Jun 2023 17:07) (83 - 83)  ABP: --  ABP(mean): --  RR: 18 (19 Jun 2023 11:10) (18 - 19)  SpO2: 93% (19 Jun 2023 11:10) (93% - 95%)    O2 Parameters below as of 19 Jun 2023 11:10  Patient On (Oxygen Delivery Method): room air          GEN: NAD,   HEENT: normocephalic and atraumatic. EOMI. ALFREDO.    NECK: Supple. No carotid bruits.  No lymphadenopathy or thyromegaly.TENDRE CERVICAL LN   LUNGS: Clear to auscultation.  HEART: Regular rate and rhythm without murmur.  ABDOMEN: Soft, nontender, and nondistended.  Positive bowel sounds.    : No CVA tenderness  EXTREMITIES: Without any cyanosis, clubbing, rash, lesions or edema.  MSK: no joint swelling  NEUROLOGIC: Cranial nerves II through XII are grossly intact.  PSYCHIATRIC: Appropriate affect .  SKIN: No ulceration or induration present.        Labs:  Vitals:  ============  T(F): 97.8 (19 Jun 2023 11:10), Max: 99 (18 Jun 2023 20:08)  HR: 85 (19 Jun 2023 11:10)  BP: 112/71 (19 Jun 2023 11:10)  RR: 18 (19 Jun 2023 11:10)  SpO2: 93% (19 Jun 2023 11:10) (93% - 95%)  temp max in last 48H T(F): , Max: 101.3 (06-17-23 @ 19:00)    =======================================================  Current Antibiotics:  cefTRIAXone Injectable. 1000 milliGRAM(s) IV Push every 24 hours    Other medications:  amLODIPine   Tablet 10 milliGRAM(s) Oral daily  dextrose 5%. 1000 milliLiter(s) IV Continuous <Continuous>  dextrose 50% Injectable 25 Gram(s) IV Push once  DULoxetine 30 milliGRAM(s) Oral daily  glucagon  Injectable 1 milliGRAM(s) IntraMuscular once  heparin   Injectable 5000 Unit(s) SubCutaneous every 12 hours  insulin lispro (ADMELOG) corrective regimen sliding scale   SubCutaneous Before meals and at bedtime  insulin lispro Injectable (ADMELOG) 2 Unit(s) SubCutaneous before lunch  insulin lispro Injectable (ADMELOG) 2 Unit(s) SubCutaneous before dinner  insulin lispro Injectable (ADMELOG) 2 Unit(s) SubCutaneous before breakfast  levothyroxine 100 MICROGram(s) Oral daily  magnesium oxide 400 milliGRAM(s) Oral three times a day with meals  metoprolol succinate ER 50 milliGRAM(s) Oral daily  pantoprazole    Tablet 40 milliGRAM(s) Oral before breakfast  sodium chloride 0.9%. 1000 milliLiter(s) IV Continuous <Continuous>      =======================================================  Labs:                        11.8   9.35  )-----------( 319      ( 19 Jun 2023 08:48 )             36.7     06-19    142  |  104  |  11.8  ----------------------------<  92  3.9   |  26.0  |  1.13    Ca    8.4      19 Jun 2023 06:19  Phos  3.0     06-19  Mg     2.1     06-19    TPro  7.2  /  Alb  3.7  /  TBili  0.7  /  DBili  x   /  AST  23  /  ALT  10  /  AlkPhos  102  06-17      Culture - Urine (collected 06-17-23 @ 20:48)  Source: Clean Catch Clean Catch (Midstream)  Final Report (06-19-23 @ 07:32):    <10,000 CFU/mL Normal Urogenital Constance    Culture - Blood (collected 06-17-23 @ 19:35)  Source: .Blood Blood-Peripheral    Culture - Blood (collected 06-17-23 @ 19:30)  Source: .Blood Blood-Peripheral      Creatinine, Serum: 1.13 mg/dL (06-19-23 @ 06:19)  Creatinine, Serum: 1.19 mg/dL (06-18-23 @ 11:24)  Creatinine, Serum: 1.23 mg/dL (06-18-23 @ 09:45)  Creatinine, Serum: 1.55 mg/dL (06-17-23 @ 20:07)            WBC Count: 9.35 K/uL (06-19-23 @ 08:48)  WBC Count: 10.45 K/uL (06-19-23 @ 06:19)  WBC Count: 20.21 K/uL (06-18-23 @ 08:30)  WBC Count: 22.47 K/uL (06-17-23 @ 20:07)    SARS-CoV-2: NotDetec (06-17-23 @ 20:07)      Alkaline Phosphatase, Serum: 102 U/L (06-17-23 @ 20:07)  Alanine Aminotransferase (ALT/SGPT): 10 U/L (06-17-23 @ 20:07)  Aspartate Aminotransferase (AST/SGOT): 23 U/L (06-17-23 @ 20:07)  Bilirubin Total, Serum: 0.7 mg/dL (06-17-23 @ 20:07)  
Patient is a 72y old  Female who presents with a chief complaint of SIRS  Strep (2023 08:34)      c/o sore throat-improving. Denies fever,chill,cp,sob,dysuria,n/v/d  REVIEW OF SYSTEMS: All systems are reviewed and found to be negative except above    MEDICATIONS  (STANDING):  amLODIPine   Tablet 10 milliGRAM(s) Oral daily  cefTRIAXone Injectable. 1000 milliGRAM(s) IV Push every 24 hours  dextrose 5%. 1000 milliLiter(s) (50 mL/Hr) IV Continuous <Continuous>  dextrose 50% Injectable 25 Gram(s) IV Push once  DULoxetine 30 milliGRAM(s) Oral daily  glucagon  Injectable 1 milliGRAM(s) IntraMuscular once  heparin   Injectable 5000 Unit(s) SubCutaneous every 12 hours  insulin lispro (ADMELOG) corrective regimen sliding scale   SubCutaneous Before meals and at bedtime  insulin lispro Injectable (ADMELOG) 2 Unit(s) SubCutaneous before lunch  insulin lispro Injectable (ADMELOG) 2 Unit(s) SubCutaneous before dinner  insulin lispro Injectable (ADMELOG) 2 Unit(s) SubCutaneous before breakfast  levothyroxine 100 MICROGram(s) Oral daily  magnesium oxide 400 milliGRAM(s) Oral three times a day with meals  metoprolol succinate ER 50 milliGRAM(s) Oral daily  pantoprazole    Tablet 40 milliGRAM(s) Oral before breakfast  sodium chloride 0.9%. 1000 milliLiter(s) (100 mL/Hr) IV Continuous <Continuous>    MEDICATIONS  (PRN):  acetaminophen     Tablet .. 650 milliGRAM(s) Oral every 6 hours PRN Temp greater or equal to 38C (100.4F), Mild Pain (1 - 3)  aluminum hydroxide/magnesium hydroxide/simethicone Suspension 30 milliLiter(s) Oral every 4 hours PRN Dyspepsia  benzocaine/menthol Lozenge 1 Lozenge Oral four times a day PRN Sore Throat  busPIRone 15 milliGRAM(s) Oral two times a day PRN anxiety  dextrose Oral Gel 15 Gram(s) Oral once PRN Blood Glucose LESS THAN 70 milliGRAM(s)/deciliter  melatonin 3 milliGRAM(s) Oral at bedtime PRN Insomnia  ondansetron Injectable 4 milliGRAM(s) IV Push every 8 hours PRN Nausea and/or Vomiting      CAPILLARY BLOOD GLUCOSE      POCT Blood Glucose.: 121 mg/dL (2023 12:52)  POCT Blood Glucose.: 94 mg/dL (2023 07:31)  POCT Blood Glucose.: 111 mg/dL (2023 21:08)  POCT Blood Glucose.: 100 mg/dL (2023 17:25)    I&O's Summary    2023 07:01  -  2023 14:15  --------------------------------------------------------  IN: 118 mL / OUT: 0 mL / NET: 118 mL        PHYSICAL EXAM:  Vital Signs Last 24 Hrs  T(C): 36.6 (2023 11:10), Max: 37.2 (2023 20:08)  T(F): 97.8 (2023 11:10), Max: 99 (2023 20:08)  HR: 85 (2023 11:10) (78 - 86)  BP: 112/71 (2023 11:10) (112/71 - 115/67)  BP(mean): 83 (2023 17:07) (83 - 83)  RR: 18 (2023 11:10) (18 - 19)  SpO2: 93% (2023 11:10) (93% - 95%)    Parameters below as of 2023 11:10  Patient On (Oxygen Delivery Method): room air        CONSTITUTIONAL: NAD,  EYES: PERRLA; conjunctiva and sclera clear  ENMT: Moist oral mucosa, erythema b/l pharynx,no pus seen. Cervical LN enlarge -Non tender  RESPIRATORY: Normal respiratory effort; lungs are clear to auscultation bilaterally  CARDIOVASCULAR: Regular rate and rhythm, normal S1 and S2, no murmur   EXTS: No lower extremity edema; Peripheral pulses are 2+ bilaterally  ABDOMEN: Nontender to palpation, normoactive bowel sounds, no rebound/guarding;   MUSCLOSKELETAL:   no joint swelling or tenderness to palpation  PSYCH: affect appropriate  NEUROLOGY: A+O to person, place, and time; CN 2-12 are intact and symmetric; no gross sensory deficits;       LABS:                        11.8   9.35  )-----------( 319      ( 2023 08:48 )             36.7     06-19    142  |  104  |  11.8  ----------------------------<  92  3.9   |  26.0  |  1.13    Ca    8.4      2023 06:19  Phos  3.0     06-19  Mg     2.1     06-19    TPro  7.2  /  Alb  3.7  /  TBili  0.7  /  DBili  x   /  AST  23  /  ALT  10  /  AlkPhos  102  06-17    PT/INR - ( 2023 20:07 )   PT: 14.5 sec;   INR: 1.25 ratio         PTT - ( 2023 20:07 )  PTT:29.5 sec      Urinalysis Basic - ( 2023 20:48 )    Color: Yellow / Appearance: Clear / S.005 / pH: x  Gluc: x / Ketone: Negative  / Bili: Negative / Urobili: Negative   Blood: x / Protein: 30 mg/dL / Nitrite: Positive   Leuk Esterase: Small / RBC: 6-10 /HPF / WBC 3-5 /HPF   Sq Epi: x / Non Sq Epi: x / Bacteria: Occasional        Culture - Urine (collected 2023 20:48)  Source: Clean Catch Clean Catch (Midstream)  Final Report (2023 07:32):    <10,000 CFU/mL Normal Urogenital Constance    Culture - Blood (collected 2023 19:35)  Source: .Blood Blood-Peripheral  Preliminary Report (2023 02:02):    No growth to date.    Culture - Blood (collected 2023 19:30)  Source: .Blood Blood-Peripheral  Preliminary Report (2023 02:02):    No growth to date.        RADIOLOGY & ADDITIONAL TESTS:  Results Reviewed:

## 2023-06-20 NOTE — DISCHARGE NOTE PROVIDER - NSDCMRMEDTOKEN_GEN_ALL_CORE_FT
acetaminophen 325 mg oral tablet: 2 tab(s) orally every 6 hours As needed Temp greater or equal to 38C (100.4F), Mild Pain (1 - 3)  Ambien 10 mg oral tablet: 1 tab(s) orally once a day (at bedtime)  amoxicillin-clavulanate 875 mg-125 mg oral tablet: 1 tab(s) orally 2 times a day  BuSpar Dividose 15 mg oral tablet: 1 tab(s) orally 2 times a day as needed for  anxiety  cevimeline 30 mg oral capsule: 1 cap(s) orally once a day  Cymbalta 30 mg oral delayed release capsule: 1 tab(s) orally once a day  Flovent Diskus 100 mcg/inh inhalation powder: 1 inhaler(s) inhaled once a day  GlyBURIDE (Eqv-DiaBeta) 2.5 mg oral tablet: 1 tablet with sensor orally once a day  lubiprostone 8 mcg oral capsule: 1 tablet with sensor orally 2 times a day  Norvasc 10 mg oral tablet: 1 tab(s) orally once a day  omeprazole 20 mg oral delayed release capsule: 1 tab(s) orally once a day  Praluent Pen 75 mg/mL subcutaneous solution: 75 dose(s) subcutaneously every 28 days  Synthroid 100 mcg (0.1 mg) oral tablet: 1 tab(s) orally once a day  Toprol-XL 50 mg oral tablet, extended release: 1 tab(s) orally once a day

## 2023-06-20 NOTE — DISCHARGE NOTE NURSING/CASE MANAGEMENT/SOCIAL WORK - PATIENT PORTAL LINK FT
You can access the FollowMyHealth Patient Portal offered by [facility] by registering at the following website: [email or web address]. By joining [employer]’s FollowMyHealth portal, you will also be able to view your health information using other applications (apps) compatible with our system.

## 2023-06-23 LAB
CULTURE RESULTS: SIGNIFICANT CHANGE UP
CULTURE RESULTS: SIGNIFICANT CHANGE UP
SPECIMEN SOURCE: SIGNIFICANT CHANGE UP
SPECIMEN SOURCE: SIGNIFICANT CHANGE UP

## 2023-07-14 ENCOUNTER — APPOINTMENT (OUTPATIENT)
Dept: ORTHOPEDIC SURGERY | Facility: CLINIC | Age: 73
End: 2023-07-14
Payer: MEDICARE

## 2023-07-14 VITALS
BODY MASS INDEX: 22.15 KG/M2 | SYSTOLIC BLOOD PRESSURE: 125 MMHG | HEART RATE: 76 BPM | DIASTOLIC BLOOD PRESSURE: 76 MMHG | HEIGHT: 63 IN | WEIGHT: 125 LBS

## 2023-07-14 DIAGNOSIS — M70.71 OTHER BURSITIS OF HIP, RIGHT HIP: ICD-10-CM

## 2023-07-14 DIAGNOSIS — M70.62 TROCHANTERIC BURSITIS, LEFT HIP: ICD-10-CM

## 2023-07-14 PROCEDURE — 20610 DRAIN/INJ JOINT/BURSA W/O US: CPT | Mod: 50

## 2023-07-14 PROCEDURE — 99213 OFFICE O/P EST LOW 20 MIN: CPT | Mod: 25

## 2023-07-14 NOTE — DISCUSSION/SUMMARY
[Medication Risks Reviewed] : Medication risks reviewed [de-identified] : 73 y/o F presents with mild bilateral hip osteoarthritis. The nature of her condition and treatment options were discussed in detail. The pt is not a candidate for a bilateral KO. The pt does have bilateral hip bursitis. She has increased pain and decreased mobility. The pt had bilateral hip bursa injections last appointment which helped. The patient elected to receive repeat cortisone injection in their bilateral trochanteric bursa today and tolerated well. F/u with us in 3 months as needed for repeat bursa cortisone injection if needed. \par

## 2023-07-14 NOTE — PHYSICAL EXAM
[Antalgic] : not antalgic [de-identified] : GENERAL APPEARANCE: Well nourished and hydrated, pleasant, alert, and oriented x 3. Appears their stated age. \par HEENT: Normocephalic, extraocular eye motion intact. Nasal septum midline. Oral cavity clear. External auditory canal clear. \par RESPIRATORY: Breath sounds clear and audible in all lobes. No wheezing, No accessory muscle use.\par CARDIOVASCULAR: No apparent abnormalities. No lower leg edema. No varicosities. Pedal pulses are palpable.\par NEUROLOGIC: Sensation is normal, no muscle weakness in the upper or lower extremities.\par DERMATOLOGIC: No apparent skin lesions, moist, warm, no rash.\par SPINE: Cervical spine appears normal and moves freely; thoracic spine appears normal and moves freely; lumbosacral spine appears normal and moves freely, normal, nontender.\par MUSCULOSKELETAL: Hands, wrists, and elbows are normal and move freely, shoulders are normal and move freely. \par Musculoskeletal: Gait: normal . \par \par Bilateral hip examination demonstrates exquisite tenderness over the bursae without stiffness or limited ROM, neg stinchfiled\par Bilateral knees show no effusion full range of motion exquisite tenderness to palpation along the patella and medial joint line no instability\par  [de-identified] : I injected the patient's left hip bursa today with cortisone for greater trochanteric bursitis.\par \par I discussed at length with the patient the planned steroid and lidocaine injection. The risks, benefits, convalescence and alternatives were reviewed. The possible side effects discussed included but were not limited to: pain, swelling, heat, bleeding, and redness. Symptoms are generally mild but if they are extensive then contact the office. Giving pain relievers by mouth such as NSAIDs or Tylenol can generally treat the reactions to steroid and lidocaine. Rare cases of infection have been noted. Rash, hives and itching may occur post injection. If you have muscle pain or cramps, flushing and or swelling of the face, rapid heart beat, nausea, dizziness, fever, chills, headache, difficulty breathing, swelling in the arms or legs, or have a prickly feeling of your skin, contact a health care provider immediately. Following this discussion, the hip was prepped with Alcohol and under sterile condition the 80 mg Depo-Medrol and 6 cc Lidocaine injection was performed with a 20 gauge needle through a superolateral injection site. The needle was introduced into the joint, aspiration was performed to ensure intra-articular placement and the medication was injected. Upon withdrawal of the needle the site was cleaned with alcohol and a band aid applied. The patient tolerated the injection well and there were no adverse effects. Post injection instructions included no strenuous activity for 24 hours, cryotherapy and if there are any adverse effects to contact the office. \par \par I injected the patient's right hip bursa today with cortisone for greater trochanteric bursitis.\par \par I discussed at length with the patient the planned steroid and lidocaine injection. The risks, benefits, convalescence and alternatives were reviewed. The possible side effects discussed included but were not limited to: pain, swelling, heat, bleeding, and redness. Symptoms are generally mild but if they are extensive then contact the office. Giving pain relievers by mouth such as NSAIDs or Tylenol can generally treat the reactions to steroid and lidocaine. Rare cases of infection have been noted. Rash, hives and itching may occur post injection. If you have muscle pain or cramps, flushing and or swelling of the face, rapid heart beat, nausea, dizziness, fever, chills, headache, difficulty breathing, swelling in the arms or legs, or have a prickly feeling of your skin, contact a health care provider immediately. Following this discussion, the hip was prepped with Alcohol and under sterile condition the 80 mg Depo-Medrol and 6 cc Lidocaine injection was performed with a 20 gauge needle through a superolateral injection site. The needle was introduced into the joint, aspiration was performed to ensure intra-articular placement and the medication was injected. Upon withdrawal of the needle the site was cleaned with alcohol and a band aid applied. The patient tolerated the injection well and there were no adverse effects. Post injection instructions included no strenuous activity for 24 hours, cryotherapy and if there are any adverse effects to contact the office.

## 2023-07-14 NOTE — HISTORY OF PRESENT ILLNESS
[Pain Location] : pain [7] : a current pain level of 7/10 [8] : a maximum pain level of 8/10 [de-identified] : 72 year old female presents with bilateral hip pain. The pt has been having pain for the past few months. She has a hx of mild bilateral hip osteoarthritis with bursitis. Patient has seen for her right hip in the past and received hip bursa injection which provided good relief few years ago. Last appointment, the pt had bilateral hip injections which helped her for 3 months. The pt is here for repeat injections.  Her pain is mostly on the lateral aspect of the hip. Pt has chronic lower back pain treated with intermittent injections by Dr. Medina. She notes difficulty with walking and laying on side due to pain Pt is on plavix and unable to take NSAIDs. She states DM is well controlled at this time and states her last A1C was normal. \par

## 2023-07-16 ENCOUNTER — EMERGENCY (EMERGENCY)
Facility: HOSPITAL | Age: 73
LOS: 1 days | Discharge: LEFT BEFORE DISCHARGE | End: 2023-07-16
Payer: MEDICARE

## 2023-07-16 VITALS
SYSTOLIC BLOOD PRESSURE: 122 MMHG | RESPIRATION RATE: 18 BRPM | HEIGHT: 60.6 IN | HEART RATE: 81 BPM | DIASTOLIC BLOOD PRESSURE: 49 MMHG

## 2023-07-16 DIAGNOSIS — N83.20 UNSPECIFIED OVARIAN CYSTS: Chronic | ICD-10-CM

## 2023-07-16 DIAGNOSIS — Z98.89 OTHER SPECIFIED POSTPROCEDURAL STATES: Chronic | ICD-10-CM

## 2023-07-16 DIAGNOSIS — Z87.19 PERSONAL HISTORY OF OTHER DISEASES OF THE DIGESTIVE SYSTEM: Chronic | ICD-10-CM

## 2023-07-16 PROCEDURE — L9991: CPT

## 2023-07-16 NOTE — ED ADULT TRIAGE NOTE - CHIEF COMPLAINT QUOTE
PT presents to ED for severe abdominal pain and vomiting. Started colonoscopy prep 5pm today, No BM.

## 2023-07-20 DIAGNOSIS — R10.9 UNSPECIFIED ABDOMINAL PAIN: ICD-10-CM

## 2023-07-20 DIAGNOSIS — Z53.21 PROCEDURE AND TREATMENT NOT CARRIED OUT DUE TO PATIENT LEAVING PRIOR TO BEING SEEN BY HEALTH CARE PROVIDER: ICD-10-CM

## 2023-07-20 DIAGNOSIS — R11.10 VOMITING, UNSPECIFIED: ICD-10-CM

## 2024-01-17 NOTE — PHYSICAL EXAM
Pt states that she is needing her oxygen filled. States that they have sent us something numerous times. Please advise.         [facility] - [phone]   [Antalgic] : not antalgic [de-identified] : GENERAL APPEARANCE: Well nourished and hydrated, pleasant, alert, and oriented x 3. Appears their stated age. \par HEENT: Normocephalic, extraocular eye motion intact. Nasal septum midline. Oral cavity clear. External auditory canal clear. \par RESPIRATORY: Breath sounds clear and audible in all lobes. No wheezing, No accessory muscle use.\par CARDIOVASCULAR: No apparent abnormalities. No lower leg edema. No varicosities. Pedal pulses are palpable.\par NEUROLOGIC: Sensation is normal, no muscle weakness in the upper or lower extremities.\par DERMATOLOGIC: No apparent skin lesions, moist, warm, no rash.\par SPINE: Cervical spine appears normal and moves freely; thoracic spine appears normal and moves freely; lumbosacral spine appears normal and moves freely, normal, nontender.\par MUSCULOSKELETAL: Hands, wrists, and elbows are normal and move freely, shoulders are normal and move freely. \par Musculoskeletal: Gait: normal . Right hip examination demonstrates exquisite tenderness over the bursae without stiffness or limited ROM, neg stinchfiled\par Bilateral knees show no effusion full range of motion exquisite tenderness to palpation along the patella and medial joint line no instability\par

## 2024-01-22 ENCOUNTER — APPOINTMENT (OUTPATIENT)
Dept: ORTHOPEDIC SURGERY | Facility: CLINIC | Age: 74
End: 2024-01-22